# Patient Record
Sex: FEMALE | Race: WHITE | Employment: OTHER | URBAN - METROPOLITAN AREA
[De-identification: names, ages, dates, MRNs, and addresses within clinical notes are randomized per-mention and may not be internally consistent; named-entity substitution may affect disease eponyms.]

---

## 2017-04-03 ENCOUNTER — HOSPITAL ENCOUNTER (OUTPATIENT)
Dept: PHYSICAL THERAPY | Age: 82
Discharge: HOME | End: 2017-04-03
Payer: MEDICARE

## 2017-04-03 PROCEDURE — 97140 MANUAL THERAPY 1/> REGIONS: CPT

## 2017-04-03 PROCEDURE — G8985 CARRY GOAL STATUS: HCPCS

## 2017-04-03 PROCEDURE — 97161 PT EVAL LOW COMPLEX 20 MIN: CPT

## 2017-04-03 PROCEDURE — G8984 CARRY CURRENT STATUS: HCPCS

## 2017-04-03 PROCEDURE — 97035 APP MDLTY 1+ULTRASOUND EA 15: CPT

## 2017-04-03 NOTE — PROGRESS NOTES
[facility] in [address] [place] [address]  Phone: [phone]      Fax:  [phone] of Care/ Statement of Necessity for Physical Therapy Services    Patient name: Trav Samuels Start of Care: 4/3/2017   Referral source: Teodoro Oppenheim : 3/10/1932    Medical Diagnosis: Pain of left shoulder region [M25.512]   Onset Date:  3 mos ago -    MD visit 3/28/17    Treatment Diagnosis:  L shoulder pain    Prior Hospitalization: see medical history Provider#: [id number]   Medications: Verified on Patient summary List    Comorbidities: essential hypertension, OA, hyperlipidemia, intracranial artery occlusion w/infarction      Prior Level of Function:  Patient typically exercises 5 days/wk including lifting weights in this facility. She has recently been unable to use some of the upper body machines, as well as has been having pain in the L shoulder with home reaching forward and upward activities such as reaching into cabinets, carrying items out in front. She recently received cortisone injection in the left shoulder on 3/28/17 and reports notable pain relief. Today's pain in 2/10. The Plan of Care and following information is based on the information from the initial evaluation. (See Objective Test Data attached). Assessment/ key information: Able to reproduce pain with special RTC/impingement tests, including: Yu, Mahin, Full/Empty Can. Patient presents today with lingering pain in the L shoulder region, along with ROM and strength deficits in shoulder flexion, scaption, internal rotation. Signs/symptoms are consistent with rotator cuff pathology and impingement syndrome. US treatment and manual mobiliztion today already improved significantly her AROM and reduced painful arc.  She will benefit from several continued sessions of skilled PT to continue to address deficits and to assist return to pain-free function in UE/carrying activities of daily living, including return to her high level of strengthening exercise routine. Evaluation Complexity History LOW Complexity : Zero comorbidities / personal factors that will impact the outcome / POC; Examination MEDIUM Complexity : 3 Standardized tests and measures addressing body structure, function, activity limitation and / or participation in recreation  ;Presentation LOW Complexity : Stable, uncomplicated  ;Clinical Decision Making MEDIUM Complexity : FOTO score of 26-74  Overall Complexity Rating: MEDIUM  Problem List: pain affecting function, decrease ROM, decrease strength, edema affecting function, decrease ADL/ functional abilitiies, decrease activity tolerance and decrease flexibility/ joint mobility   Treatment Plan may include any combination of the following: Therapeutic exercise, Therapeutic activities, Neuromuscular re-education, Physical agent/modality, Manual therapy, Patient education, Self Care training and Home safety training  Patient / Family readiness to learn indicated by: asking questions, trying to perform skills and interest  Persons(s) to be included in education: patient (P)  Barriers to Learning/Limitations: None  Patient Goal (s): To reduce the pain with my daily activities  Patient Self Reported Health Status:  Good   Rehabilitation Potential: excellent    Short Term Goals: To be accomplished in 2  treatments:    1)  Decrease pain with reaching activities to < 2/10 consistently. 2)  Increase AROM of L shoulder flex and abduction to > 140°, symmetrical to R for reaching up and forward and carrying items pain-free. 3)  Establish HEP to include postural retraining and scapular strengthening exercises. Long Term Goals: To be accomplished in 4 weeks/ less than or equal to 8  treatments:     1) Eliminate  L shoulder and scapular pain.    2)  L UE/shoulder AROM WNL and symmetrical ROM to R, for ability to reach up, forward, behind back and carry groceries/items > 10# without pain. 3) Strength of L shoulder muscles to 4/5 or better for completing all reaching/carrying ADL's and to return to her UE lifting exercises at the gym. 4) Evidence of return to normal ADL function as evidenced by FOTO score improvement of at least 20 pts and ability to reach forward and up in loaded ADL's.   5)  Independent with home/gym exercise program to include return to chest press and UE strength exercises. Frequency / Duration: Patient to be seen 2 times per week for 4  weeks, or up to 8 treatments. Patient/ Caregiver education and instruction: Diagnosis, prognosis, self care, activity modification and exercises      [x]  Plan of care has been reviewed with STAN    G-Gerardo (GP)  Carry  Current:   G 8984  CK - 40 - 59%    Goal:    G 8985  CJ - 20 - 39%      The severity rating is based on clinical judgment and the FOTO score. Certification Period:   4/3/2017 -  5/2/2017     Flako Reed, PT 4/3/2017 5:05 PM  _____________________________________________________________________  I certify that the above Therapy Services are being furnished while the patient is under my care. I agree with the treatment plan and certify that this therapy is necessary. Physician's Signature:____________________  Date:__________Time:______    Please sign and return to   [facility] in [address] NTika Anderson [address] [place]     Phone: [phone]      Fax:  [phone]

## 2017-04-03 NOTE — PROGRESS NOTES
Physical Therapy Evaluation - Shoulder    Posture: [x] Poor    [] Fair    [] Good    Describe: B abducted, superiorly rotated scapulae, forward head. B tight pecs. Scapular dyskinesis w/hypomobility - L > R.      ROM:  [] Unable to assess at this time                                           AROM                                                              PROM   Left Right  Left Right   Flexion 80p! 160 Flexion 110p! WNL   Extension 60  Extension     Scaption/ABD 75p! 165 Scaption/ABD 130p! ER @ 0 Degrees 40  ER @ 0 Degrees No p! ER @ 90 Degrees 65  ER @ 90 Degrees 80    IR @ 90 Degrees 50  IR @ 90 Degrees 70      End Feel / Painful Arc:  @ 110°    Strength:   [] Unable to assess at this time                                                                            L (1-5) R (1-5) Pain   Flexors 2+/5  [x] Yes   [] No   Abductors 2+/5  [x] Yes   [] No   External Rotators 3/5  [x] Yes   [] No   Internal Rotators 3+/5  [] Yes   [] No   Supraspinatus 2+/5  [] Yes   [] No   Serratus Anterior 3+/5  [] Yes   [] No   Lower Trapezius 2+/5  [] Yes   [] No   Elbow Flexion 4/5  [] Yes   [] No   Elbow Extension 4-/5  [] Yes   [] No       Scapulohumoral Control / Rhythm:  Able to eccentrically lower with good control?  Left: [] Yes   [x] No     Right: [x] Yes   [] No    Accessory Motions:    Palpation  [] Min  [] Mod  [x] Severe    Location:  SST joint/insert/belly   [] Min  [x] Mod  [] Severe    Location:   A-C joint   [] Min  [x] Mod  [] Severe    Location:   UT belly       Adson's Test  [] Pos   [] Neg Yergason's Test [] Pos   [] Neg  Cathryn's Test  [] Pos   [] Neg [place]'s Sign [] Pos   [] Neg  Neer's Test  [x] Pos   [] Neg Clunk Test  [] Pos   [] Neg  Hawkin's Test  [x] Pos   [] Neg AC Joint  [x] Pos   [] Neg  Speed's Test  [] Pos   [] Neg SC Joint  [] Pos   [x] Neg  Empty Can  [x] Pos   [] Neg Pectoral Tightness [x] Pos   [] Neg  Anterior Apprehension [] Pos   [] Neg   Posterior Apprehension [x] Pos   [] Neg      Other Tests / Comments:

## 2017-04-03 NOTE — PROGRESS NOTES
PT DAILY TREATMENT NOTE - [facility]     Patient Name: Bridget Mcneill  Date:4/3/2017  : 3/10/1932  [x]  Patient  Verified  Payor: Dolly Mohan / Plan: [place] MED[place] PART [place] & B / Product Type: Medicare /    In time: 3:28   Out time:  4: 32  Total Treatment Time (min):  64  Total Timed Codes (min):  55  1:1 Treatment Time ( only): 54  Visit #:   1  of  8     Treatment Area: Pain of left shoulder region [M25.512]    SUBJECTIVE  Pain Level (0-10 scale):  2/10  Any medication changes, allergies to medications, adverse drug reactions, diagnosis change, or new procedure performed?: [x] No    [x] Yes (see summary sheet for update)  Subjective functional status/changes:   [] No changes reported    Patient reports definite pain relief in the L shoulder since receiving the cortisone injection last week. She had been having pain closer to 7/10 consistently for several weeks now. No specific injury incident - progressively worsening pain in L shoulder/neck region in past 3 mos. OBJECTIVE    Modality rationale: decrease inflammation, decrease pain and increase tissue extensibility to improve the patients ability to move L UE in normal functional ranges.      Min Type Additional Details    [] Estim:  []Unatt       []IFC  []Premod                        []Other:  []w/ice   []w/heat  Position:  Location:    [] Estim: []Att    []TENS instruct  []NMES                    []Other:  []w/US   []w/ice   []w/heat  Position:  Location:    []  Traction: [] Cervical       []Lumbar                       [] Prone          []Supine                       []Intermittent   []Continuous Lbs:  [] before manual  [] after manual   13 [x]  Ultrasound: []Continuous   [x] Pulsed                           []1MHz   [x]3MHz W/cm2: .8 for 7' to L SST/bursa   Location:region of L shoulder     []  Iontophoresis with dexamethasone         Location: [] Take home patch   [] In clinic   10 [x]  Ice     []  heat  []  Ice massage  []  Laser   []  Anodyne Position:  Long-sit with back, neck  & L UE support   Location:  L shoulder     []  Laser with stim  []  Other:  Position:  Location:    []  Vasopneumatic Device Pressure:       [] lo [] med [] hi   Temperature: [] lo [] med [] hi   [x] Skin assessment post-treatment:  [x]intact []redness- no adverse reaction    []redness  adverse reaction:     15 min []Eval                  []Re-Eval       2 min Therapeutic Exercise:  [x] See flow sheet :  Scapular AROM/shrugs/roll   No charge - HEP    15 min Manual Therapy:  B Scap, L GH-gr II/III    Rationale: decrease pain, increase ROM, increase tissue extensibility, decrease trigger points and increase postural awareness to all more normalized L shoulder movement, increase L UE in ADL function. With   [] TE   [x] TA   [] neuro   [] other: Patient Education: [x] Review HEP    [] Progressed/Changed HEP based on:   [x] positioning   [x] body mechanics   [] transfers   [x] heat/ice application    [x] other: postural retraining      Other Objective/Functional Measures:  L sh A/PROM - flex/abd:  70/75 pre-tx. 110 and 130 post-tx. FOTO = 46     Pain Level (0-10 scale) post treatment: 0/10    ASSESSMENT/Changes in Function:  Active L shoulder ROM increased 40 degrees today, in pain-free range following treatment. Notable fascial restrictions released within [facility] and scapulothoracic joints during mobilizations likely contributing to relief and motion improvement. Patient will continue to benefit from skilled PT services to modify and progress therapeutic interventions, address ROM deficits, address strength deficits, analyze and address soft tissue restrictions, analyze and cue movement patterns, analyze and modify body mechanics/ergonomics and assess and modify postural abnormalities to attain remaining goals.      [x]  See Plan of Care  []  See progress note/recertification  []  See Discharge Summary         Progress towards goals / Updated goals:   Improved active range in first visit. Plan to focus on postural retraining, scapular strengthening, elimination of pain, with anticipation of 4-6 visits needed to achieve goal of pain-free functional movement.        See POC for details - GOALS (1st visit today)    PLAN  [x]  Upgrade activities as tolerated     []  Continue plan of care  []  Update interventions per flow sheet       []  Discharge due to:_  []  Other:_      Aisha Chavez, PT 4/3/2017  4:33 PM    Future Appointments  Date Time Provider Dario Smart   4/7/2017 1:30 PM [facility]   4/11/2017 10:30 AM Jaki Gongora, PT [facility]   4/13/2017 10:30 AM Jaki Gongora, PT [facility]   4/18/2017 10:00 AM Jaki Gongora, PT [facility]   4/20/2017 10:00 AM Jaki Gongora, PT [facility]   4/25/2017 10:00 AM Jaki Gongora, PT [facility]   4/27/2017 10:00 AM Jaki Gongora, PT [facility]

## 2017-04-07 ENCOUNTER — HOSPITAL ENCOUNTER (OUTPATIENT)
Dept: PHYSICAL THERAPY | Age: 82
Discharge: HOME | End: 2017-04-07
Payer: MEDICARE

## 2017-04-07 PROCEDURE — 97110 THERAPEUTIC EXERCISES: CPT

## 2017-04-07 PROCEDURE — 97140 MANUAL THERAPY 1/> REGIONS: CPT

## 2017-04-07 NOTE — PROGRESS NOTES
PT DAILY TREATMENT NOTE - [facility]     Patient Name: Mahamed Smith  Date:2017  : 3/10/1932  [x]  Patient  Verified  Payor: Isauro  / Plan: [place] MEDICARE PART A & B / Product Type: Medicare /    In time:1:35   Out time:2:12  Total Treatment Time (min): 37  Total Timed Codes (min): 27  1:1 Treatment Time ( only): 27  Visit #: 2  of  8    Treatment Area: Pain of left shoulder region [M25.512]    SUBJECTIVE  Pain Level (0-10 scale):  310  Any medication changes, allergies to medications, adverse drug reactions, diagnosis change, or new procedure performed?: [x] No    [] Yes (see summary sheet for update)  Subjective functional status/changes:   [] No changes reported     Patient reporting that she is doing better - still some pain, but only with heavier activities     OBJECTIVE    Modality rationale: decrease pain and increase tissue extensibility to improve the patients ability to use L shoulder in work & home ADL's    Min Type Additional Details    [] Estim:  []Unatt       []IFC  []Premod                        []Other:  []w/ice   []w/heat  Position:  Location:    [] Estim: []Att    []TENS instruct  []NMES                    []Other:  []w/US   []w/ice   []w/heat  Position:  Location:    []  Traction: [] Cervical       []Lumbar                       [] Prone          []Supine                       []Intermittent   []Continuous Lbs:  [] before manual  [] after manual    []  Ultrasound: []Continuous   [] Pulsed                           []1MHz   []3MHz W/cm2:  Location:    []  Iontophoresis with dexamethasone         Location: [] Take home patch   [] In clinic   10 []  Ice     [x]  heat  []  Ice massage  []  Laser   []  Anodyne Position:  Supine w/support of head and L UE  Location:  L shoulder     []  Laser with stim  []  Other:  Position:  Location:    []  Vasopneumatic Device Pressure:       [] lo [] med [] hi   Temperature: [] lo [] med [] hi   [] Skin assessment post-treatment:  []intact []redness- no adverse reaction    []redness  adverse reaction:      min []Eval                  []Re-Eval       15 min Therapeutic Exercise:  [x] See flow sheet :   Rationale: increase ROM, increase strength and increase proprioception    15 min Manual Therapy:  scap mobs, GH mobs (post/inf, gr II/III), PROM all planes;  TPR L UT. NMR for scapular isolation without shrugging. Rationale: decrease pain, increase ROM, increase tissue extensibility, decrease trigger points and increase postural awareness            With   [x] TE   [] TA   [] neuro   [x] other: Patient Education: [x] Review HEP    [] Progressed/Changed HEP based on:   [] positioning   [] body mechanics   [] transfers   [] heat/ice application    [x] other:      Other Objective/Functional Measures:  Increased AROM of 20° since last visit      Pain Level (0-10 scale) post treatment:  0/10    ASSESSMENT/Changes in Function:  Notably improved ability to use the L UE pain-free through initial painful arc (to 130 deg) before discomfort ensues. Also less pain overall. Notable release of L scapulothoracic restrictions. Decrease in L TP following manual.  Focus on postural restoration and improving scapular mobility and thoracic strength. Patient will continue to benefit from skilled PT services to modify and progress therapeutic interventions, address ROM deficits, address strength deficits, analyze and address soft tissue restrictions, analyze and cue movement patterns, analyze and modify body mechanics/ergonomics and assess and modify postural abnormalities to attain remaining goals. [x]  See Plan of Care  []  See progress note/recertification  []  See Discharge Summary         Progress towards goals / Updated goals: To be accomplished in 2  treatments:     1) Decrease pain with reaching activities to < 2/10 consistently. Progressing.   2) Increase AROM of L shoulder flex and abduction to > 140°, symmetrical to R for reaching up and forward and carrying items pain-free. Progressing; to 130 deg today. 3) Establish HEP to include postural retraining and scapular strengthening exercises. Progressing     Long Term Goals: To be accomplished in 4 weeks/ less than or equal to 8  treatments:      1) Eliminate L shoulder and scapular pain. 2) L UE/shoulder AROM WNL and symmetrical ROM to R, for ability to reach up, forward, behind back and carry groceries/items > 10# without pain. Not assessed  3) Strength of L shoulder muscles to 4/5 or better for completing all reaching/carrying ADL's and to return to her UE lifting exercises at the gym. Progressing   4) Evidence of return to normal ADL function as evidenced by FOTO score improvement of at least 20 pts and ability to reach forward and up in loaded ADL's. Not assesed  5) Independent with home/gym exercise program to include return to chest press and UE strength exercises. Begin next visit.      PLAN  [x]  Upgrade activities as tolerated     [x]  Continue plan of care  []  Update interventions per flow sheet       []  Discharge due to:_  []  Other:_      Lilly Villeda PT 4/7/2017  3:23 PM    Future Appointments  Date Time Provider Dario Smart   4/11/2017 10:30 AM [facility]   4/13/2017 10:30 AM Magaly Ch PT [facility]   4/18/2017 10:00 AM Magaly Ch PT [facility]   4/20/2017 10:00 AM MARINA ConleyHPMARIELOS THE [facility]   4/25/2017 10:00 AM MARINA ConleyHPMARIELOS THE [facility]   4/27/2017 10:00 AM Kortney Ortiz, PT MIHPTRENATA THE [facility]

## 2017-04-11 ENCOUNTER — HOSPITAL ENCOUNTER (OUTPATIENT)
Dept: PHYSICAL THERAPY | Age: 82
Discharge: HOME | End: 2017-04-11
Payer: MEDICARE

## 2017-04-11 PROCEDURE — 97110 THERAPEUTIC EXERCISES: CPT

## 2017-04-11 NOTE — PROGRESS NOTES
PT DAILY TREATMENT NOTE - [facility]     Patient Name: Antwon Session  Date:2017  : 3/10/1932  [x]  Patient  Verified  Payor: Erica Pak / Plan: [place] MEDICARE PART A & B / Product Type: Medicare /    In time:10:33  Out time:11:20  Total Treatment Time (min): 47  Total Timed Codes (min): 37  1:1 Treatment Time ( [address] only): 0   Visit #: 3 of 8    Treatment Area: Pain of left shoulder region [M25.512]    SUBJECTIVE  Pain Level (0-10 scale): 1/10  Any medication changes, allergies to medications, adverse drug reactions, diagnosis change, or new procedure performed?: [x] No    [] Yes (see summary sheet for update)  Subjective functional status/changes:   [] No changes reported  \"Im doing good.  My shoulder bothered me some last night after I spent the entire day trying to pack to sell my house\"     OBJECTIVE    Modality rationale: decrease inflammation and decrease pain to improve the patients ability to perform overhead ADLs    Min Type Additional Details    [] Estim:  []Unatt       []IFC  []Premod                        []Other:  []w/ice   []w/heat  Position:  Location:    [] Estim: []Att    []TENS instruct  []NMES                    []Other:  []w/US   []w/ice   []w/heat  Position:  Location:    []  Traction: [] Cervical       []Lumbar                       [] Prone          []Supine                       []Intermittent   []Continuous Lbs:  [] before manual  [] after manual    []  Ultrasound: []Continuous   [] Pulsed                           []1MHz   []3MHz W/cm2:  Location:    []  Iontophoresis with dexamethasone         Location: [] Take home patch   [] In clinic   10 []  Ice     [x]  heat  []  Ice massage  []  Laser   []  Anodyne Position: Supine with LEs on wedge  Location: left shoulder     []  Laser with stim  []  Other:  Position:  Location:    []  Vasopneumatic Device Pressure:       [] lo [] med [] hi   Temperature: [] lo [] med [] hi   [] Skin assessment post-treatment:  []intact []redness- no adverse reaction    []redness  adverse reaction:      min []Eval                  []Re-Eval       37 total  0 1:1 min Therapeutic Exercise:  [x] See flow sheet : added wall press, added corner pec sretch, added rows, added shoulder ext    Rationale: increase ROM, increase strength, improve coordination and increase proprioception to improve the patients ability to perform overhead ADLs      min Therapeutic Activity:  []  See flow sheet :         min Neuromuscular Re-education:  []  See flow sheet :        min Manual Therapy:          min Gait Training:  ___ feet with ___ device on level surfaces with ___ level of assist             With   [] TE   [] TA   [] neuro   [] other: Patient Education: [x] Review HEP    [] Progressed/Changed HEP based on:   [] positioning   [] body mechanics   [] transfers   [] heat/ice application    [] other:      Other Objective/Functional Measures:   No objective measures taken today     Pain Level (0-10 scale) post treatment: 0/10    ASSESSMENT/Changes in Function:   Pt motivated and gave good effort during session. She was able to tolerate session with only mild complaints of increased left shoulder discomfort. She required VCs for proper form with exercises. Patient will continue to benefit from skilled PT services to modify and progress therapeutic interventions, address ROM deficits, address strength deficits, analyze and address soft tissue restrictions, analyze and cue movement patterns, analyze and modify body mechanics/ergonomics and assess and modify postural abnormalities to attain remaining goals.      [x] See Plan of Care  [] See progress note/recertification  [] See Discharge Summary      Progress towards goals / Updated goals: To be accomplished in 2 treatments:      1) Decrease pain with reaching activities to < 2/10 consistently. Current status: Progressing.      2) Increase AROM of L shoulder flex and abduction to > 140°, symmetrical to R for reaching up and forward and carrying items pain-free. .  Current status: Progressing; to 130 deg today    3) Establish HEP to include postural retraining and scapular strengthening exercises.   Current status:  Progressing      Long Term Goals: To be accomplished in 4 weeks/ less than or equal to 8 treatments:      1) Eliminate L shoulder and scapular pain. Current status: not assessed    2) L UE/shoulder AROM WNL and symmetrical ROM to R, for ability to reach up, forward, behind back and carry groceries/items > 10# without pain. Current status: not assessed    3) Strength of L shoulder muscles to 4/5 or better for completing all reaching/carrying ADL's and to return to her UE lifting exercises at the gym. Current status: Progressing     4) Evidence of return to normal ADL function as evidenced by FOTO score improvement of at least 20 pts and ability to reach forward and up in loaded ADL's. Current status: not assessed    5) Independent with home/gym exercise program to include return to chest press and UE strength exercises. Begin next visit.    Current status: not assessed    PLAN  [x]  Upgrade activities as tolerated     [x]  Continue plan of care  []  Update interventions per flow sheet       []  Discharge due to:_  []  Other:_      Mal Domínguez 4/11/2017  11:14 AM    Future Appointments  Date Time Provider Dario Smart   4/13/2017 10:30 AM [facility] HANNAH THE [facility]   4/18/2017 10:00 AM Graydon Lesches, PT MI[facility]PTD THE [facility]   4/20/2017 10:00 AM Graydon Lesches, PT MI[facility]PTD THE [facility]   4/25/2017 10:00 AM Graydon Lesches, PT [facility]PTD THE [facility]   4/27/2017 10:00 AM MARINA De La Torre THE [facility]

## 2017-04-13 ENCOUNTER — HOSPITAL ENCOUNTER (OUTPATIENT)
Dept: PHYSICAL THERAPY | Age: 82
Discharge: HOME | End: 2017-04-13
Payer: MEDICARE

## 2017-04-13 NOTE — PROGRESS NOTES
PT DAILY TREATMENT NOTE - [facility]     Patient Name: Harpreet Keyes  Date:2017  : 3/10/1932  [x]  Patient  Verified  Payor: Indra Calix / Plan: [place] / Product Type: Medicare /    In time:10:35  Out time:11:20  Total Treatment Time (min): 45  Total Timed Codes (min): 35  1:1 Treatment Time ( [address] only): 0   Visit #: 4 of 8    Treatment Area: Pain of left shoulder region [M25.512]    SUBJECTIVE  Pain Level (0-10 scale): 1/10  Any medication changes, allergies to medications, adverse drug reactions, diagnosis change, or new procedure performed?: [x] No    [] Yes (see summary sheet for update)  Subjective functional status/changes:   [] No changes reported  \"Im doing okay.  There a little pain when I go to move the arm, but mostly just stretching\"     OBJECTIVE    Modality rationale: decrease inflammation and decrease pain to improve the patients ability to normalize overhead ADLs    Min Type Additional Details    [] Estim:  []Unatt       []IFC  []Premod                        []Other:  []w/ice   []w/heat  Position:  Location:    [] Estim: []Att    []TENS instruct  []NMES                    []Other:  []w/US   []w/ice   []w/heat  Position:  Location:    []  Traction: [] Cervical       []Lumbar                       [] Prone          []Supine                       []Intermittent   []Continuous Lbs:  [] before manual  [] after manual    []  Ultrasound: []Continuous   [] Pulsed                           []1MHz   []3MHz W/cm2:  Location:    []  Iontophoresis with dexamethasone         Location: [] Take home patch   [] In clinic   10 []  Ice     [x]  heat  []  Ice massage  []  Laser   []  Anodyne Position: supine   Location: left shoulder     []  Laser with stim  []  Other:  Position:  Location:    []  Vasopneumatic Device Pressure:       [] lo [] med [] hi   Temperature: [] lo [] med [] hi   [] Skin assessment post-treatment:  []intact []redness- no adverse reaction    []redness  adverse reaction: min []Eval                  []Re-Eval       35 total  0 1:1 min Therapeutic Exercise:  [x] See flow sheet : added supine D2 diagonals, added serratus punches   Rationale: increase ROM, increase strength and improve coordination to improve the patients ability to normalize overhead ADLs      min Therapeutic Activity:  []  See flow sheet :         min Neuromuscular Re-education:  []  See flow sheet :        min Manual Therapy:          min Gait Training:  ___ feet with ___ device on level surfaces with ___ level of assist             With   [] TE   [] TA   [] neuro   [] other: Patient Education: [x] Review HEP    [] Progressed/Changed HEP based on:   [] positioning   [] body mechanics   [] transfers   [] heat/ice application    [] other:      Other Objective/Functional Measures:   Left shoulder AROM  Flexion: 128 degrees  Abduction: 135 degrees     Pain Level (0-10 scale) post treatment: 0/10    ASSESSMENT/Changes in Function:   Pt able to tolerate progression of shoulder and postural strengthening without increased discomfort. Pt with difficulty isolating proper movement with serratus punches. Patient will continue to benefit from skilled PT services to modify and progress therapeutic interventions, address ROM deficits, address strength deficits, analyze and address soft tissue restrictions, analyze and cue movement patterns, analyze and modify body mechanics/ergonomics and assess and modify postural abnormalities to attain remaining goals.      [x] See Plan of Care  [] See progress note/recertification  [] See Discharge Summary      Progress towards goals / Updated goals: To be accomplished in 2 treatments:      1) Decrease pain with reaching activities to < 2/10 consistently. Current status: Progressing (Pain 3/10 at worst with reaching) 4/13/17       2) Increase AROM of L shoulder flex and abduction to > 140°, symmetrical to R for reaching up and forward and carrying items pain-free. .  Current status: Progressing (Left shoulder abduction 135 degrees, flexion 128 degrees) 4/13/17      3) Establish HEP to include postural retraining and scapular strengthening exercises.   Current status:  Progressing       Long Term Goals: To be accomplished in 4 weeks/ less than or equal to 8 treatments:      1) Eliminate L shoulder and scapular pain. Current status: not assessed     2) L UE/shoulder AROM WNL and symmetrical ROM to R, for ability to reach up, forward, behind back and carry groceries/items > 10# without pain. Current status: not assessed     3) Strength of L shoulder muscles to 4/5 or better for completing all reaching/carrying ADL's and to return to her UE lifting exercises at the gym. Current status: Progressing      4) Evidence of return to normal ADL function as evidenced by FOTO score improvement of at least 20 pts and ability to reach forward and up in loaded ADL's. Current status: not assessed     5) Independent with home/gym exercise program to include return to chest press and UE strength exercises. Begin next visit.    Current status: not assessed    PLAN  [x]  Upgrade activities as tolerated     [x]  Continue plan of care  []  Update interventions per flow sheet       []  Discharge due to:_  []  Other:_      Emry Side 4/13/2017  12:02 PM    Future Appointments  Date Time Provider Dario Smart   4/18/2017 10:00 AM MARINA Saldana THE [facility]   4/20/2017 10:00 AM MARINA Saldana THE [facility]   4/25/2017 10:00 AM MARINA Saldana THE [facility]   4/27/2017 10:00 AM MARINA Díaz THE [facility]

## 2017-04-18 ENCOUNTER — HOSPITAL ENCOUNTER (OUTPATIENT)
Dept: PHYSICAL THERAPY | Age: 82
Discharge: HOME | End: 2017-04-18
Payer: MEDICARE

## 2017-04-18 PROCEDURE — 97110 THERAPEUTIC EXERCISES: CPT

## 2017-04-18 NOTE — PROGRESS NOTES
PT DAILY TREATMENT NOTE - [facility]     Patient Name: Salma Perez  Date:2017  : 3/10/1932  [x]  Patient  Verified  Payor: Fabi Terrazas / Plan: [place] MED[place]ARE PART A [place] B / Product Type: Medicare /    In time:10:05  Out time:11:00  Total Treatment Time (min): 55  Total Timed Codes (min): 55  1:1 Treatment Time ( [address] only): 54   Visit #: 5 of 8    Treatment Area: Pain of left shoulder region [M25.512]    SUBJECTIVE  Pain Level (0-10 scale): 0/10  Any medication changes, allergies to medications, adverse drug reactions, diagnosis change, or new procedure performed?: [x] No    [] Yes (see summary sheet for update)  Subjective functional status/changes:   [] No changes reported  \"I don't always have pain, but it hurts inconsistently when I do certain things. \"    OBJECTIVE    Modality rationale:    Min Type Additional Details    [] Estim:  []Unatt       []IFC  []Premod                        []Other:  []w/ice   []w/heat  Position:  Location:    [] Estim: []Att    []TENS instruct  []NMES                    []Other:  []w/US   []w/ice   []w/heat  Position:  Location:    []  Traction: [] Cervical       []Lumbar                       [] Prone          []Supine                       []Intermittent   []Continuous Lbs:  [] before manual  [] after manual    []  Ultrasound: []Continuous   [] Pulsed                           []1MHz   []3MHz W/cm2:  Location:    []  Iontophoresis with dexamethasone         Location: [] Take home patch   [] In clinic    []  Ice     []  heat  []  Ice massage  []  Laser   []  Anodyne Position:  Location:    []  Laser with stim  []  Other:  Position:  Location:    []  Vasopneumatic Device Pressure:       [] lo [] med [] hi   Temperature: [] lo [] med [] hi   [] Skin assessment post-treatment:  []intact []redness- no adverse reaction    []redness  adverse reaction:      min []Eval                  []Re-Eval       55 1:1 min Therapeutic Exercise:  [x] See flow sheet :  Added wall pushups at [employer] level, increased load of shrugs to 5 lbs. Load, added serratus anterior presses making circles with ball on wall   Rationale: increase ROM, increase strength, improve coordination and increase proprioception to improve the patients ability to normalize ADLs. min Therapeutic Activity:  []  See flow sheet :         min Neuromuscular Re-education:  []  See flow sheet :        min Manual Therapy:          min Gait Training:  ___ feet with ___ device on level surfaces with ___ level of assist   Rationale: With   [] TE   [] TA   [] neuro   [] other: Patient Education: [x] Review HEP    [] Progressed/Changed HEP based on:   [] positioning   [] body mechanics   [] transfers   [] heat/ice application    [] other:      Other Objective/Functional Measures:   No objective measures taken today. Pain Level (0-10 scale) post treatment: 0/10    ASSESSMENT/Changes in Function:    Pt reports intermittent and inconsistent pain in the left shoulder during ADLs and exercises. Strength and function are improving. Patient will continue to benefit from skilled PT services to modify and progress therapeutic interventions, address ROM deficits, address strength deficits, analyze and address soft tissue restrictions, analyze and cue movement patterns, analyze and modify body mechanics/ergonomics and assess and modify postural abnormalities to attain remaining goals. []  See Plan of Care  []  See progress note/recertification  []  See Discharge Summary         Progress towards goals / Updated goals: To be accomplished in 2 treatments:  1) Decrease pain with reaching activities to < 2/10 consistently. Status at initial evaluation:  Current status: Progressing (Pain 3/10 at worst with reaching) 4/13/17       2) Increase AROM of left shoulder flex and abduction to > 140°, symmetrical to right for reaching up and forward and carrying items pain-free. .  Status at initial evaluation:  Current status: Progressing (Left shoulder abduction 135 degrees, flexion 128 degrees) 4/13/17       3) Establish HEP to include postural retraining and scapular strengthening exercises. Status at initial evaluation:  Current status: met      Long Term Goals: To be accomplished in 4 weeks/ less than or equal to 8 treatments:  1) Eliminate left shoulder and scapular pain. Current status: not assessed      2) Left UE/shoulder AROM WNL and symmetrical ROM to right, for ability to reach up, forward, behind back and carry groceries/items > 10# without pain. Status at initial evaluation:  Current status: not assessed      3) Strength of left shoulder muscles to 4/5 or better for completing all reaching/carrying ADL's and to return to her UE lifting exercises at the gym. Status at initial evaluation:  Current status: Progressing       4) Evidence of return to normal ADL function as evidenced by FOTO score improvement of at least 20 pts and ability to reach forward and up in loaded ADL's. Status at initial evaluation:  Current status: not assessed      5) Independent with home/gym exercise program to include return to chest press and UE strength exercises. Begin next visit.    Status at initial evaluation:  Current status: not assessed    PLAN  []  Upgrade activities as tolerated     [x]  Continue plan of care  []  Update interventions per flow sheet       []  Discharge due to:_  []  Other:_      Guido Melissa PT 4/18/2017  9:59 AM    Future Appointments  Date Time Provider Dario Smart   4/18/2017 10:00 AM MARINA MaharajHPMARIELOS THE [facility]   4/20/2017 10:00 AM AMRINA MaharajHPMARIELOS [facility]   4/25/2017 10:00 AM MARINA MaharajHPMARIELOS THE [facility]   4/27/2017 10:00 AM Kortney Lyons PT [facility]HPTRENATA THE [facility]

## 2017-04-20 ENCOUNTER — HOSPITAL ENCOUNTER (OUTPATIENT)
Dept: PHYSICAL THERAPY | Age: 82
Discharge: HOME | End: 2017-04-20
Payer: MEDICARE

## 2017-04-20 PROCEDURE — 97110 THERAPEUTIC EXERCISES: CPT

## 2017-04-20 NOTE — PROGRESS NOTES
PT DAILY TREATMENT NOTE - [facility]     Patient Name: Bridget Mcneill  Date:2017  : 3/10/1932  [x]  Patient  Verified  Payor: [place] MEDICARE / Plan: [place] MEDICARE PART A & B / Product Type: Medicare /    In time:10:00  Out time:10:52  Total Treatment Time (min): 52  Total Timed Codes (min): 42  1:1 Treatment Time ([facility] only): 15   Visit #: 6 of 8    Treatment Area: Pain of left shoulder region [M25.512]    SUBJECTIVE  Pain Level (0-10 scale): 0/10  Any medication changes, allergies to medications, adverse drug reactions, diagnosis change, or new procedure performed?: [x] No    [] Yes (see summary sheet for update)  Subjective functional status/changes:   [] No changes reported  \"Im doing fine. My shoulder doesn't really hurt, its just tight when I move it in certain directions. \"    OBJECTIVE    Modality rationale: decrease inflammation and decrease pain to improve the patients ability to normalize overhead ADLs    Min Type Additional Details    [] Estim:  []Unatt       []IFC  []Premod                        []Other:  []w/ice   []w/heat  Position:  Location:    [] Estim: []Att    []TENS instruct  []NMES                    []Other:  []w/US   []w/ice   []w/heat  Position:  Location:    []  Traction: [] Cervical       []Lumbar                       [] Prone          []Supine                       []Intermittent   []Continuous Lbs:  [] before manual  [] after manual    []  Ultrasound: []Continuous   [] Pulsed                           []1MHz   []3MHz W/cm2:  Location:    []  Iontophoresis with dexamethasone         Location: [] Take home patch   [] In clinic   10 []  Ice     [x]  heat  []  Ice massage  []  Laser   []  Anodyne Position: supine   Location: left shoulder and neck    []  Laser with stim  []  Other:  Position:  Location:    []  Vasopneumatic Device Pressure:       [] lo [] med [] hi   Temperature: [] lo [] med [] hi   [] Skin assessment post-treatment:  []intact []redness- no adverse reaction    []redness  adverse reaction:      min []Eval                  []Re-Eval       42 total 15 1:1 min Therapeutic Exercise:  [x] See flow sheet : added shoulder depressions    Rationale: increase ROM and increase strength to improve the patients ability to normalize overhead ADLs      min Therapeutic Activity:  []  See flow sheet :         min Neuromuscular Re-education:  []  See flow sheet :        min Manual Therapy:          min Gait Training:  ___ feet with ___ device on level surfaces with ___ level of assist             With   [] TE   [] TA   [] neuro   [] other: Patient Education: [x] Review HEP    [] Progressed/Changed HEP based on:   [] positioning   [] body mechanics   [] transfers   [] heat/ice application    [] other:      Other Objective/Functional Measures:   Left shoulder strength grossly 4-/5 throughout     Pain Level (0-10 scale) post treatment: 0/10    ASSESSMENT/Changes in Function:   Pt able to complete session with good tolerance. She is nearly independent with her program; however, requires occassional VCs for proper form with some clinic eercises at this time. Patient will continue to benefit from skilled PT services to modify and progress therapeutic interventions, address ROM deficits, address strength deficits, analyze and address soft tissue restrictions, analyze and cue movement patterns, analyze and modify body mechanics/ergonomics and assess and modify postural abnormalities to attain remaining goals.      [x] See Plan of Care  [] See progress note/recertification  [] See Discharge Summary      Progress towards goals / Updated goals: To be accomplished in 2 treatments:  1) Decrease pain with reaching activities to < 2/10 consistently.    Status at initial evaluation:  Current status: MET (Pt reports pain 4/37 at worst with certain overhead activities) 4/20/17       2) Increase AROM of left shoulder flex and abduction to > 140°, symmetrical to right for reaching up and forward and carrying items pain-free. .  Status at initial evaluation:  Current status: Progressing (Left shoulder abduction 135 degrees, flexion 128 degrees) 4/13/17       3) Establish HEP to include postural retraining and scapular strengthening exercises. Status at initial evaluation: HEP not established   Current status: MET (HEP established ) 4/13/17      Long Term Goals: To be accomplished in 4 weeks/ less than or equal to 8 treatments:  1) Eliminate left shoulder and scapular pain. Current status: Progressing (Pt reports pain 7/42 at worst with certain overhead activities) 4/20/17      2) Left UE/shoulder AROM WNL and symmetrical ROM to right, for ability to reach up, forward, behind back and carry groceries/items > 10# without pain. Status at initial evaluation:     AROM       Left Right   Flexion 80p! 160   Extension 60     Scaption/ABD 75p! 165   ER @ 0 Degrees 40     ER @ 90 Degrees 65     IR @ 90 Degrees 50     Current status: not assessed      3) Strength of left shoulder muscles to 4/5 or better for completing all reaching/carrying ADL's and to return to her UE lifting exercises at the gym. Status at initial evaluation:  Left shoulder flexion and abduction 2+/5, left shoulder IR 3+/5, left shoulder ER 3/5   Current status:Progressing (Left shoulder strength grossly 4-/5 throughout) 4/20/17       4) Evidence of return to normal ADL function as evidenced by FOTO score improvement of at least 20 pts and ability to reach forward and up in loaded ADL's. Status at initial evaluation: FOTO 51/100  Current status: Progressing (FOTO score improved 14 points to 65/100) 4/18/17      5) Independent with home/gym exercise program to include return to chest press and UE strength exercises.     Status at initial evaluation: Program to be initiated   Current status: Progressed (Pt is independent with prescribed HEP; however, she requires occassional VCs for proper form with clinic exercises and has not progressed to chest press at this time) 4/20/17    PLAN  [x]  Upgrade activities as tolerated     [x]  Continue plan of care  []  Update interventions per flow sheet       []  Discharge due to:_  []  Other:_      Dorthula Duane 4/20/2017  1:42 PM    Future Appointments  Date Time Provider Dario Smart   4/25/2017 10:00 AM MARINA Hinton [facility]   4/27/2017 10:00 AM MARINA Thomas THE [facility]

## 2017-04-25 ENCOUNTER — HOSPITAL ENCOUNTER (OUTPATIENT)
Dept: PHYSICAL THERAPY | Age: 82
Discharge: HOME | End: 2017-04-25
Payer: MEDICARE

## 2017-04-25 PROCEDURE — 97110 THERAPEUTIC EXERCISES: CPT

## 2017-04-25 NOTE — PROGRESS NOTES
PT DAILY TREATMENT NOTE - [facility]     Patient Name: Mario Alberto Peterson  Date:2017  : 3/10/1932  [x]  Patient  Verified  Payor: Baldemar Ray / Plan: [place] & [place] / Product Type: Medicare /    In time:10:01  Out time:10:45  Total Treatment Time (min): 44  Total Timed Codes (min): 44  1:1 Treatment Time ( [address] only): 40   Visit #: 7 of 8    Treatment Area: Pain of left shoulder region [M25.512]    SUBJECTIVE  Pain Level (0-10 scale): 0/10  Any medication changes, allergies to medications, adverse drug reactions, diagnosis change, or new procedure performed?: [x] No    [] Yes (see summary sheet for update)  Subjective functional status/changes:   [] No changes reported  \"I'm doing 100% of all my normal activities. Pain 0/10 at worst during all activities. \"    OBJECTIVE    Modality rationale:    Min Type Additional Details    [] Estim:  []Unatt       []IFC  []Premod                        []Other:  []w/ice   []w/heat  Position:  Location:    [] Estim: []Att    []TENS instruct  []NMES                    []Other:  []w/US   []w/ice   []w/heat  Position:  Location:    []  Traction: [] Cervical       []Lumbar                       [] Prone          []Supine                       []Intermittent   []Continuous Lbs:  [] before manual  [] after manual    []  Ultrasound: []Continuous   [] Pulsed                           []1MHz   []3MHz W/cm2:  Location:    []  Iontophoresis with dexamethasone         Location: [] Take home patch   [] In clinic    []  Ice     []  heat  []  Ice massage  []  Laser   []  Anodyne Position:  Location:    []  Laser with stim  []  Other:  Position:  Location:    []  Vasopneumatic Device Pressure:       [] lo [] med [] hi   Temperature: [] lo [] med [] hi   [] Skin assessment post-treatment:  []intact []redness- no adverse reaction    []redness  adverse reaction:      min []Eval                  []Re-Eval       44 min Therapeutic Exercise:  [x] See flow sheet :  Assessed strength and AROM Rationale: increase ROM, increase strength, improve coordination and increase proprioception to improve the patients ability to normalize reaching and ADLs. min Therapeutic Activity:  []  See flow sheet :         min Neuromuscular Re-education:  []  See flow sheet :        min Manual Therapy:          min Gait Training:  ___ feet with ___ device on level surfaces with ___ level of assist   Rationale: With   [] TE   [] TA   [] neuro   [] other: Patient Education: [x] Review HEP    [] Progressed/Changed HEP based on:   [] positioning   [] body mechanics   [] transfers   [] heat/ice application    [] other:      Other Objective/Functional Measures:   AROM shoulder flexion: 166 degrees (improved 86 degrees)  Abduction: 156 degrees (improved 82 degrees)  Scaption: 150 degrees (improved 75 degrees)  ER: T2 level bilaterally   IR: T6 level left, T8 level right  Strength shoulder flexion: 4/5 (improved 1 2/3 grade)  Abduction: 4/5 (improved 1 2/3 grade)  Shoulder extension: 5/5  ER: 4+/5 (improved 1 1/3 grade)  IR: 4+/5 (improved 1 grade)  Elbow flexion: 5/5 (improved 1 grade)  Elbow extension: 5/5 (improved 1 1/3 grade)    Pain Level (0-10 scale) post treatment: 0/10    ASSESSMENT/Changes in Function:    Pt's left UE strength and AROM has improved significantly compared to [facility] and AROM is equal to right UE. Patient will continue to benefit from skilled PT services to modify and progress therapeutic interventions, address ROM deficits, address strength deficits, analyze and address soft tissue restrictions, analyze and cue movement patterns, analyze and modify body mechanics/ergonomics and assess and modify postural abnormalities to attain remaining goals. []  See Plan of Care  []  See progress note/recertification  []  See Discharge Summary         Progress towards goals / Updated goals: To be accomplished in 2 treatments:  1) Decrease pain with reaching activities to < 2/10 consistently.    Status at initial evaluation:  Current status: MET (Pt reports pain 7/57 at worst with certain overhead activities) 4/20/17       2) Increase AROM of left shoulder flex and abduction to > 140°, symmetrical to right for reaching up and forward and carrying items pain-free. .  Status at initial evaluation:  Current status: met (AROM shoulder flexion: 166 degrees (improved 86 degrees), Abduction: 156 degrees (improved 82 degrees)) 4/25/17      3) Establish HEP to include postural retraining and scapular strengthening exercises. Status at initial evaluation: HEP not established   Current status: MET (HEP established ) 4/13/17      Long Term Goals: To be accomplished in 4 weeks/ less than or equal to 8 treatments:  1) Eliminate left shoulder and scapular pain. Current status: Progressing (Pt reports pain 8/86 at worst with certain overhead activities) 4/20/17      2) Left UE/shoulder AROM WNL and symmetrical ROM to right, for ability to reach up, forward, behind back and carry groceries/items > 10# without pain. Status at initial evaluation:  AROM        Left Right   Flexion 80p! 160   Extension 60      Scaption/ABD 75p! 165   ER @ 0 Degrees 40      ER @ 90 Degrees 65      IR @ 90 Degrees 50      Current status: met (AROM shoulder flexion: 166 degrees (improved 86 degrees), Abduction: 156 degrees (improved 82 degrees), Scaption: 150 degrees (improved 75 degrees), ER: T2 level bilaterally, IR: T6 level left, T8 level right) 4/25/17      3) Strength of left shoulder muscles to 4/5 or better for completing all reaching/carrying ADL's and to return to her UE lifting exercises at the gym.    Status at initial evaluation: Left shoulder flexion and abduction 2+/5, left shoulder IR 3+/5, left shoulder ER 3/5   Current status:met (Strength shoulder flexion: 4/5 (improved 1 2/3 grade), Abduction: 4/5 (improved 1 2/3 grade), Shoulder extension: 5/5, ER: 4+/5 (improved 1 1/3 grade), IR: 4+/5 (improved 1 grade), Elbow flexion: 5/5 (improved 1 grade), Elbow extension: 5/5 (improved 1 1/3 grade)) 4/25/17       4) Evidence of return to normal ADL function as evidenced by FOTO score improvement of at least 20 pts and ability to reach forward and up in loaded ADL's. Status at initial evaluation: FOTO 51/100  Current status: Progressing (FOTO score improved 14 points to 65/100) 4/18/17      5) Independent with home/gym exercise program to include return to chest press and UE strength exercises.    Status at initial evaluation: Program to be initiated   Current status: Progressed (Pt is independent with prescribed HEP; however, she requires occassional VCs for proper form with clinic exercises and has not progressed to chest press at this time) 4/20/17    PLAN  []  Upgrade activities as tolerated     [x]  Continue plan of care  []  Update interventions per flow sheet       []  Discharge due to:_  []  Other: plan to D/C after next treatment to self monitored HEP      Rosanne Byrne, PT 4/25/2017  10:52 AM    Future Appointments  Date Time Provider Dario Smart   4/27/2017 10:00 AM [facility]

## 2017-04-27 ENCOUNTER — HOSPITAL ENCOUNTER (OUTPATIENT)
Dept: PHYSICAL THERAPY | Age: 82
Discharge: HOME | End: 2017-04-27
Payer: MEDICARE

## 2017-04-27 PROCEDURE — 97530 THERAPEUTIC ACTIVITIES: CPT

## 2017-04-27 PROCEDURE — G8985 CARRY GOAL STATUS: HCPCS

## 2017-04-27 PROCEDURE — G8986 CARRY D/C STATUS: HCPCS

## 2017-04-27 PROCEDURE — 97110 THERAPEUTIC EXERCISES: CPT

## 2017-04-27 NOTE — PROGRESS NOTES
[facility] in [facility]. [place], [address] [place] [address]  Phone: [phone]      Fax:  [phone]    Discharge Summary      Patient name: Erick Shin Start of Care: 4/3/2017   Referral source: Poncho Kylah : 3/10/1932    Medical Diagnosis: Pain of left shoulder region [M25.512] Onset Date: 3 mos ago - MD visit 3/28/17    Treatment Diagnosis: L shoulder pain    Prior Hospitalization: see medical history Provider#: [id number]   Medications: Verified on Patient summary List   Comorbidities: essential hypertension, OA, hyperlipidemia, intracranial artery occlusion w/infarction       Visits from Start of Care: 8    Missed Visits: 0  Reporting Period : 4/3/17 to 17      Progress towards goals / Updated goals:Mrs. Nathanael Arnett has shown excellent improvement in left shoulder mobility, strength and overall function. She is pain free and has returned to all ADL. 8 of 9 goals have been met and her FOTO score has improved from 51/100 to 69 (Goal almost met). She is independent with an advanced HEP and will be discharged from therapy at this time. To be accomplished in 2 treatments:  1) Decrease pain with reaching activities to < 2/10 consistently. Status at initial evaluation:  Current status: MET (Pt reports pain 4/43 at worst with certain overhead activities) 17       2) Increase AROM of left shoulder flex and abduction to > 140°, symmetrical to right for reaching up and forward and carrying items pain-free. .  Status at initial evaluation:  Current status: met (AROM shoulder flexion: 166 degrees (improved 86 degrees), Abduction: 156 degrees (improved 82 degrees)) 17      3) Establish HEP to include postural retraining and scapular strengthening exercises. Status at initial evaluation: HEP not established   Current status: MET (HEP established ) 17      Long Term Goals:  To be accomplished in 4 weeks/ less than or equal to 8 treatments:  1) Eliminate left shoulder and scapular pain. Current status: Progressing (Pt reports pain 7/25 at worst with certain overhead activities) 4/20/17  Status at D/C: no pain with ADL, goal met      2) Left UE/shoulder AROM WNL and symmetrical ROM to right, for ability to reach up, forward, behind back and carry groceries/items > 10# without pain. Status at initial evaluation:  AROM        Left Right   Flexion 80p! 160   Extension 60      Scaption/ABD 75p! 165   ER @ 0 Degrees 40      ER @ 90 Degrees 65      IR @ 90 Degrees 50      Current status: met (AROM shoulder flexion: 166 degrees (improved 86 degrees), Abduction: 156 degrees (improved 82 degrees), Scaption: 150 degrees (improved 75 degrees), ER: T2 level bilaterally, IR: T6 level left, T8 level right) 4/25/17      3) Strength of left shoulder muscles to 4/5 or better for completing all reaching/carrying ADL's and to return to her UE lifting exercises at the gym. Status at initial evaluation: Left shoulder flexion and abduction 2+/5, left shoulder IR 3+/5, left shoulder ER 3/5   Current status:met (Strength shoulder flexion: 4/5 (improved 1 2/3 grade), Abduction: 4/5 (improved 1 2/3 grade), Shoulder extension: 5/5, ER: 4+/5 (improved 1 1/3 grade), IR: 4+/5 (improved 1 grade), Elbow flexion: 5/5 (improved 1 grade), Elbow extension: 5/5 (improved 1 1/3 grade)) 4/25/17 , goal met      4) Evidence of return to normal ADL function as evidenced by FOTO score improvement of at least 20 pts and ability to reach forward and up in loaded ADL's. Status at initial evaluation: FOTO 51/100  Current status: Progressing (FOTO score improved 14 points to 65/100) 4/18/17  Status at D/C: FOTO 69/100, goal almost met        5) Independent with home/gym exercise program to include return to chest press a    nd UE strength exercises.    Status at initial evaluation: Program to be initiated   Current status: Progressed (Pt is independent with prescribed HEP; however, she requires occassional VCs for proper form with clinic exercises and has not progressed to chest press at this time) 4/20/17  Status at D/C: Independent with HEP, will resume post rehab program, goal met  G-Codes (GP)      Carry    Goal  CJ= 20-39%   D/C  CJ= 20-39%        The severity rating is based on clinical judgment and the FOTO score.     Assessment/ Summary of Care: excellent progress and function    RECOMMENDATIONS:  [x]Discontinue therapy: [x]Patient has reached or is progressing toward set goals      []Patient is non-compliant or has abdicated      []Due to lack of appreciable progress towards set goals    Lady Gaspar, PT 4/27/2017 11:26 AM

## 2017-04-27 NOTE — PROGRESS NOTES
PT DAILY TREATMENT NOTE - [facility]     Patient Name: Karen Conteh  Date:2017  : 3/10/1932  [x]  Patient  Verified  Payor: Eve Chamber / Plan: [place] MEDICARE PART A & B / Product Type: Medicare /    In time:1009  Out time:10:50  Total Treatment Time (min): 41  Total Timed Codes (min): 41  1:1 Treatment Time ([facility] only): 41   Visit #: 8 of 8    Treatment Area: Pain of left shoulder region [M25.512]    SUBJECTIVE  Pain Level (0-10 scale): 0/10  Any medication changes, allergies to medications, adverse drug reactions, diagnosis change, or new procedure performed?: [x] No    [] Yes (see summary sheet for update)  Subjective functional status/changes:   [] No changes reported  \"I'm doing fine, have returned to all function. No more pain. \"    OBJECTIVE    Modality rationale:    Min Type Additional Details    [] Estim:  []Unatt       []IFC  []Premod                        []Other:  []w/ice   []w/heat  Position:  Location:    [] Estim: []Att    []TENS instruct  []NMES                    []Other:  []w/US   []w/ice   []w/heat  Position:  Location:    []  Traction: [] Cervical       []Lumbar                       [] Prone          []Supine                       []Intermittent   []Continuous Lbs:  [] before manual  [] after manual    []  Ultrasound: []Continuous   [] Pulsed                           []1MHz   []3MHz W/cm2:  Location:    []  Iontophoresis with dexamethasone         Location: [] Take home patch   [] In clinic    []  Ice     []  heat  []  Ice massage  []  Laser   []  Anodyne Position:  Location:    []  Laser with stim  []  Other:  Position:  Location:    []  Vasopneumatic Device Pressure:       [] lo [] med [] hi   Temperature: [] lo [] med [] hi   [] Skin assessment post-treatment:  []intact []redness- no adverse reaction    []redness  adverse reaction:      min []Eval                  []Re-Eval       26 min Therapeutic Exercise:  [x] See flow sheet :  Assessed strength and AROM   Rationale: increase ROM, increase strength, improve coordination and increase proprioception to improve the patients ability to normalize reaching and ADLs. 15 min Therapeutic Activity:  [x]  See flow sheet :reevaluation, review of HEP         min Neuromuscular Re-education:  []  See flow sheet :        min Manual Therapy:          min Gait Training:  ___ feet with ___ device on level surfaces with ___ level of assist   Rationale: With   [] TE   [] TA   [] neuro   [] other: Patient Education: [x] Review HEP    [] Progressed/Changed HEP based on:   [] positioning   [] body mechanics   [] transfers   [] heat/ice application    [] other:      Other Objective/Functional Measures:   AROM shoulder flexion: 166 degrees (improved 86 degrees)  Abduction: 156 degrees (improved 82 degrees)  Scaption: 150 degrees (improved 75 degrees)  ER: T2 level bilaterally   IR: T6 level left, T8 level right  Strength shoulder flexion: 4/5 (improved 1 2/3 grade)  Abduction: 4/5 (improved 1 2/3 grade)  Shoulder extension: 5/5  ER: 4+/5 (improved 1 1/3 grade)  IR: 4+/5 (improved 1 grade)  Elbow flexion: 5/5 (improved 1 grade)  Elbow extension: 5/5 (improved 1 1/3 grade)  Residual tightness with overhead reaching  FOTO 69, improved    Pain Level (0-10 scale) post treatment: 0/10      [x]  See Discharge Summary         Progress towards goals / Updated Waneta Schwab has shown excellent improvement in left shoulder mobility, strength and overall function. She is pain free and has returned to all ADL. 8 of 9 goals have been met and her FOTO score has improved from 51/100 to 69 (Goal almost met). She is independent with an advanced HEP and will be discharged from therapy at this time. To be accomplished in 2 treatments:  1) Decrease pain with reaching activities to < 2/10 consistently.    Status at initial evaluation:  Current status: MET (Pt reports pain 3/49 at worst with certain overhead activities) 4/20/17       2) Increase AROM of left shoulder flex and abduction to > 140°, symmetrical to right for reaching up and forward and carrying items pain-free. .  Status at initial evaluation:  Current status: met (AROM shoulder flexion: 166 degrees (improved 86 degrees), Abduction: 156 degrees (improved 82 degrees)) 4/25/17      3) Establish HEP to include postural retraining and scapular strengthening exercises. Status at initial evaluation: HEP not established   Current status: MET (HEP established ) 4/13/17      Long Term Goals: To be accomplished in 4 weeks/ less than or equal to 8 treatments:  1) Eliminate left shoulder and scapular pain. Current status: Progressing (Pt reports pain 9/22 at worst with certain overhead activities) 4/20/17  Status at D/C: no pain with ADL, goal met      2) Left UE/shoulder AROM WNL and symmetrical ROM to right, for ability to reach up, forward, behind back and carry groceries/items > 10# without pain. Status at initial evaluation:  AROM        Left Right   Flexion 80p! 160   Extension 60      Scaption/ABD 75p! 165   ER @ 0 Degrees 40      ER @ 90 Degrees 65      IR @ 90 Degrees 50      Current status: met (AROM shoulder flexion: 166 degrees (improved 86 degrees), Abduction: 156 degrees (improved 82 degrees), Scaption: 150 degrees (improved 75 degrees), ER: T2 level bilaterally, IR: T6 level left, T8 level right) 4/25/17      3) Strength of left shoulder muscles to 4/5 or better for completing all reaching/carrying ADL's and to return to her UE lifting exercises at the gym.    Status at initial evaluation: Left shoulder flexion and abduction 2+/5, left shoulder IR 3+/5, left shoulder ER 3/5   Current status:met (Strength shoulder flexion: 4/5 (improved 1 2/3 grade), Abduction: 4/5 (improved 1 2/3 grade), Shoulder extension: 5/5, ER: 4+/5 (improved 1 1/3 grade), IR: 4+/5 (improved 1 grade), Elbow flexion: 5/5 (improved 1 grade), Elbow extension: 5/5 (improved 1 1/3 grade)) 4/25/17 , goal met      4) Evidence of return to normal ADL function as evidenced by FOTO score improvement of at least 20 pts and ability to reach forward and up in loaded ADL's. Status at initial evaluation: FOTO 51/100  Current status: Progressing (FOTO score improved 14 points to 65/100) 4/18/17  Status at D/C: FOTO 69/100, goal almost met        5) Independent with home/gym exercise program to include return to chest press a    nd UE strength exercises. Status at initial evaluation: Program to be initiated   Current status: Progressed (Pt is independent with prescribed HEP; however, she requires occassional VCs for proper form with clinic exercises and has not progressed to chest press at this time) 4/20/17  Status at D/C: Independent with HEP, will resume post rehab program, goal met    PLAN      [x]  Discharge due to: essential goals being met_      Kortney Cisse, PT 4/27/2017  10:52 AM    No future appointments.

## 2021-02-09 ENCOUNTER — HOSPITAL ENCOUNTER (OUTPATIENT)
Age: 86
Setting detail: OBSERVATION
Discharge: HOME | End: 2021-02-10
Attending: EMERGENCY MEDICINE | Admitting: INTERNAL MEDICINE
Payer: MEDICARE

## 2021-02-09 ENCOUNTER — APPOINTMENT (OUTPATIENT)
Dept: MRI IMAGING | Age: 86
End: 2021-02-09
Attending: EMERGENCY MEDICINE
Payer: MEDICARE

## 2021-02-09 ENCOUNTER — APPOINTMENT (OUTPATIENT)
Dept: GENERAL RADIOLOGY | Age: 86
End: 2021-02-09
Attending: EMERGENCY MEDICINE
Payer: MEDICARE

## 2021-02-09 ENCOUNTER — APPOINTMENT (OUTPATIENT)
Dept: CT IMAGING | Age: 86
End: 2021-02-09
Attending: EMERGENCY MEDICINE
Payer: MEDICARE

## 2021-02-09 DIAGNOSIS — G45.9 TIA (TRANSIENT ISCHEMIC ATTACK): Primary | ICD-10-CM

## 2021-02-09 LAB
ALBUMIN SERPL-MCNC: 4 G/DL (ref 3.5–4.7)
ALBUMIN/GLOB SERPL: 1.3 {RATIO}
ALP SERPL-CCNC: 60 U/L (ref 38–126)
ALT SERPL-CCNC: 18 U/L (ref 3–52)
ANION GAP SERPL CALC-SCNC: 8 MMOL/L
AST SERPL W P-5'-P-CCNC: 18 U/L (ref 14–74)
BASOPHILS # BLD: 0 K/UL (ref 0–0.1)
BASOPHILS NFR BLD: 0 % (ref 0–2)
BILIRUB SERPL-MCNC: 0.7 MG/DL (ref 0.2–1)
BUN SERPL-MCNC: 21 MG/DL (ref 9–21)
BUN/CREAT SERPL: 26
CA-I BLD-MCNC: 9.8 MG/DL (ref 8.5–10.5)
CHLORIDE SERPL-SCNC: 107 MMOL/L (ref 94–111)
CK MB SERPL-MCNC: 3.7 NG/ML (ref 5–25)
CK SERPL-CCNC: 71 U/L (ref 26–140)
CO2 SERPL-SCNC: 24 MMOL/L (ref 21–33)
CREAT SERPL-MCNC: 0.8 MG/DL (ref 0.7–1.2)
EOSINOPHIL # BLD: 0.1 K/UL (ref 0–0.4)
EOSINOPHIL NFR BLD: 1 % (ref 0–5)
ERYTHROCYTE [DISTWIDTH] IN BLOOD BY AUTOMATED COUNT: 14.1 % (ref 11.6–14.5)
GLOBULIN SER CALC-MCNC: 3.1 G/DL
GLUCOSE BLD STRIP.AUTO-MCNC: 118 MG/DL (ref 70–110)
GLUCOSE SERPL-MCNC: 118 MG/DL (ref 70–110)
HCT VFR BLD AUTO: 40.3 % (ref 35–45)
HGB BLD-MCNC: 12.3 G/DL (ref 12–16)
IMM GRANULOCYTES # BLD AUTO: 0 K/UL
IMM GRANULOCYTES NFR BLD AUTO: 0 %
LYMPHOCYTES # BLD: 2 K/UL (ref 0.9–3.6)
LYMPHOCYTES NFR BLD: 30 % (ref 21–52)
MCH RBC QN AUTO: 28.5 PG (ref 24–34)
MCHC RBC AUTO-ENTMCNC: 30.5 G/DL (ref 31–37)
MCV RBC AUTO: 93.3 FL (ref 74–97)
MONOCYTES # BLD: 0.5 K/UL (ref 0.05–1.2)
MONOCYTES NFR BLD: 7 % (ref 3–10)
NEUTS SEG # BLD: 4.1 K/UL (ref 1.8–8)
NEUTS SEG NFR BLD: 62 % (ref 40–73)
PERFORMED BY, TECHID: ABNORMAL
PLATELET # BLD AUTO: 214 K/UL (ref 135–420)
PMV BLD AUTO: 11.4 FL (ref 9.2–11.8)
POTASSIUM SERPL-SCNC: 4.2 MMOL/L (ref 3.2–5.1)
PROT SERPL-MCNC: 7.1 G/DL (ref 6.1–8.4)
RBC # BLD AUTO: 4.32 M/UL (ref 4.2–5.3)
SODIUM SERPL-SCNC: 139 MMOL/L (ref 135–145)
TROPONIN I SERPL-MCNC: <0.02 NG/ML (ref 0.02–0.05)
WBC # BLD AUTO: 6.7 K/UL (ref 4.6–13.2)

## 2021-02-09 PROCEDURE — 70450 CT HEAD/BRAIN W/O DYE: CPT

## 2021-02-09 PROCEDURE — 80053 COMPREHEN METABOLIC PANEL: CPT

## 2021-02-09 PROCEDURE — 80061 LIPID PANEL: CPT

## 2021-02-09 PROCEDURE — 36415 COLL VENOUS BLD VENIPUNCTURE: CPT

## 2021-02-09 PROCEDURE — 82962 GLUCOSE BLOOD TEST: CPT

## 2021-02-09 PROCEDURE — 99218 HC RM OBSERVATION: CPT

## 2021-02-09 PROCEDURE — 71045 X-RAY EXAM CHEST 1 VIEW: CPT

## 2021-02-09 PROCEDURE — 93005 ELECTROCARDIOGRAM TRACING: CPT

## 2021-02-09 PROCEDURE — 84484 ASSAY OF TROPONIN QUANT: CPT

## 2021-02-09 PROCEDURE — 70551 MRI BRAIN STEM W/O DYE: CPT

## 2021-02-09 PROCEDURE — 85025 COMPLETE CBC W/AUTO DIFF WBC: CPT

## 2021-02-09 PROCEDURE — 99285 EMERGENCY DEPT VISIT HI MDM: CPT

## 2021-02-09 PROCEDURE — 70547 MR ANGIOGRAPHY NECK W/O DYE: CPT

## 2021-02-09 PROCEDURE — 70544 MR ANGIOGRAPHY HEAD W/O DYE: CPT

## 2021-02-09 PROCEDURE — 82550 ASSAY OF CK (CPK): CPT

## 2021-02-09 PROCEDURE — 82553 CREATINE MB FRACTION: CPT

## 2021-02-09 PROCEDURE — 74011250637 HC RX REV CODE- 250/637: Performed by: EMERGENCY MEDICINE

## 2021-02-09 RX ORDER — ENOXAPARIN SODIUM 100 MG/ML
40 INJECTION SUBCUTANEOUS DAILY
Status: DISCONTINUED | OUTPATIENT
Start: 2021-02-10 | End: 2021-02-10 | Stop reason: HOSPADM

## 2021-02-09 RX ORDER — GUAIFENESIN 100 MG/5ML
81 LIQUID (ML) ORAL DAILY
Status: DISCONTINUED | OUTPATIENT
Start: 2021-02-10 | End: 2021-02-10 | Stop reason: HOSPADM

## 2021-02-09 RX ORDER — SODIUM CHLORIDE 0.9 % (FLUSH) 0.9 %
5-40 SYRINGE (ML) INJECTION EVERY 8 HOURS
Status: DISCONTINUED | OUTPATIENT
Start: 2021-02-09 | End: 2021-02-10 | Stop reason: HOSPADM

## 2021-02-09 RX ORDER — ACETAMINOPHEN 325 MG/1
650 TABLET ORAL
Status: DISCONTINUED | OUTPATIENT
Start: 2021-02-09 | End: 2021-02-10 | Stop reason: HOSPADM

## 2021-02-09 RX ORDER — ONDANSETRON 2 MG/ML
4 INJECTION INTRAMUSCULAR; INTRAVENOUS
Status: DISCONTINUED | OUTPATIENT
Start: 2021-02-09 | End: 2021-02-10 | Stop reason: HOSPADM

## 2021-02-09 RX ORDER — ACETAMINOPHEN 650 MG/1
650 SUPPOSITORY RECTAL
Status: DISCONTINUED | OUTPATIENT
Start: 2021-02-09 | End: 2021-02-10 | Stop reason: HOSPADM

## 2021-02-09 RX ORDER — SODIUM CHLORIDE 0.9 % (FLUSH) 0.9 %
5-40 SYRINGE (ML) INJECTION AS NEEDED
Status: DISCONTINUED | OUTPATIENT
Start: 2021-02-09 | End: 2021-02-10 | Stop reason: HOSPADM

## 2021-02-09 RX ORDER — CLOPIDOGREL BISULFATE 75 MG/1
300 TABLET ORAL
Status: COMPLETED | OUTPATIENT
Start: 2021-02-09 | End: 2021-02-09

## 2021-02-09 RX ORDER — GUAIFENESIN 100 MG/5ML
81 LIQUID (ML) ORAL DAILY
Status: DISCONTINUED | OUTPATIENT
Start: 2021-02-10 | End: 2021-02-09 | Stop reason: SDUPTHER

## 2021-02-09 RX ORDER — POLYETHYLENE GLYCOL 3350 17 G/17G
17 POWDER, FOR SOLUTION ORAL DAILY PRN
Status: DISCONTINUED | OUTPATIENT
Start: 2021-02-09 | End: 2021-02-10 | Stop reason: HOSPADM

## 2021-02-09 RX ORDER — PROMETHAZINE HYDROCHLORIDE 25 MG/1
12.5 TABLET ORAL
Status: DISCONTINUED | OUTPATIENT
Start: 2021-02-09 | End: 2021-02-10 | Stop reason: HOSPADM

## 2021-02-09 RX ORDER — METOPROLOL TARTRATE 50 MG/1
100 TABLET ORAL DAILY
Status: DISCONTINUED | OUTPATIENT
Start: 2021-02-10 | End: 2021-02-10 | Stop reason: HOSPADM

## 2021-02-09 RX ADMIN — Medication 10 ML: at 18:38

## 2021-02-09 RX ADMIN — Medication 10 ML: at 21:15

## 2021-02-09 RX ADMIN — CLOPIDOGREL BISULFATE 300 MG: 75 TABLET ORAL at 13:17

## 2021-02-09 NOTE — PROGRESS NOTES
Patient sitting in bed watching tv neuro check complete.  Patient tolerated water with no difficulty patient denies any need sat this tome CB in reach

## 2021-02-09 NOTE — ED TRIAGE NOTES
Pt. States her exercise class ended at 945 AM. Shortly after she states, she began to feel dizzy and having blurred vision. Pt. States that after that she remembers people who work at the gym coming at her with oxygen and she could not move her left side and she remembers frieda speech being slurred. EMS states on scene pt. Had left sided facial droop and left sided weakness. Pt. Also had some drooling and slurred speech. By the time pt. Arrived at the ER pts. Symptoms had resolved. PT. States she did have her first covid vaccine yesterday.

## 2021-02-09 NOTE — H&P
History and Physical    Patient: Dang Jones MRN: [id number]      YOB: 1932  Age: 80 y.o. Sex: female      Subjective:      Dang Jones is a 80 y.o. female  With a PMHx of HTN presents to the ED with complaints of sudden onset of stroke symptoms. Patient states that she was working out at [employer], when she had a sudden bout of blurred vision and weakness. Upon EMS arrival, they state that they noticed some mild slurred speech, left sided weakness, mild facial droop and some drooling at well. Patient informed EMS that she received the first dose of the COVID vaccine yesterday. In ED, CTof head remarkable for mild atropy only, CXR unremarkable, BP elevated at 152/106 otherwise VSS, EKG NSR rate @ 66, CBC & BMP unremarkable, troponin negative,     Pt examined at the bedside. A&OX3 with no sign of distress, discomfort, and pain. Pt satting 97% on RA. CNII-XII grossly intact, no neuro deficits noted. No acute events reported during admissions process  Will continue to assess patient. Plan of care as noted below. Discussed admission with patient and patient agrees with all questions answered    Code Status: Full Code  DVT prophylaxis:Lovenox 40mg daily SubQ  Medical Proxy: Rogelio Reynaga or Jo Cassidy [phone]    Approximate time spent on admission was 45 minutes. Patient will be admitted for TIA (transient ischemic attack) [G45.9]  to hospitalist service as Inpatient and evaluated daily via physical assessment, diagnostic testing, and laboratory assessment. Past Medical History:   Diagnosis Date    Hypertension      Past Surgical History:   Procedure Laterality Date    HX CATARACT REMOVAL      HX CHOLECYSTECTOMY      HX HYSTERECTOMY      HX KNEE REPLACEMENT      HX THYROIDECTOMY      PROSTHETIC IMPLANT NOS        History reviewed. No pertinent family history.   Social History     Tobacco Use    Smoking status: Never Smoker    Smokeless tobacco: Never Used Substance Use Topics    Alcohol use: No     Frequency: Never      Allergies   Allergen Reactions    Niacin Preparations Other (comments)     Hot flashes    Statins-Hmg-Coa Reductase Inhibitors Other (comments)     Nauseas, light headed     Immunizations are UTD per pt. Review of Systems:  - CONSTITUTIONAL: Denies  fatigue, weight loss, fever and chills. - HEENT: Denies changes in vision and hearing.    - RESPIRATORY: Denies SOB and cough. - CV: Denies palpitations and CP.     - GI: Denies abdominal pain, nausea, vomiting, diarrhea and constipation.    - : Denies dysuria and urinary frequency. - MSK: Denies myalgia and joint pain. - SKIN: Denies rash and pruritus.    - NEUROLOGICAL: Denies dizziness, weakness, headache and syncope. - PSYCHIATRIC: Denies recent changes in mood. Denies anxiety and depression. Objective:     Vitals:    02/09/21 1032 02/09/21 1131 02/09/21 1232 02/09/21 1301   BP: (!) 152/106 (!) 149/65 (!) 158/57 (!) 168/71   Pulse: 69 (!) 59 61 (!) 59   Resp: 16      Temp: 98.9 °F (37.2 °C)      SpO2: 97%   97%   Weight: 88.5 kg (195 lb)      Height: 5' 5\" (1.651 m)           Physical Exam:  - GENERAL: Alert and oriented x 3. No acute distress. Well-nourished.      - HEENT: EOMI. Anicteric. PERRLA,Moist mucous membranes. No scleral icterus. No cervical lymphadenopathy. Oropharynx moist without any lesions    -NECK: Supple, no tracheal deviation, no JVD, no significant lymphadenopathy, no thyromegaly noted. - LUNGS: Clear to auscultation bilaterally. No accessory muscle use. Chest symmetrical, No wheezing, rales, rhonchi noted. Appropriate respiratory effort.     - CARDIOVASCULAR: Regular rate and rhythm. No murmur, rubs, gallops, No edema appreciated. S1 & S2 audible. - ABDOMEN: Soft, non-tender and non-distended. No palpable masses. , lesions, hepatomegaly. Bowels active X4 quadrants. - SKIN: Warm, dry, intact, no bruising, lesions, or rashes noted.  Color appropriate for ethnicity.     - MUSCULOSKELETAL: Ambulates independently, no deformities, Full ROM     - NEUROLOGIC: No focal neurological deficits. CN II-XII grossly intact, Muscle strength 5/5, both U & L bilateral DTR in lower extremities 2+. - PSYCHIATRIC: Calm & Cooperative. Appropriate mood and affect. Recent Results (from the past 12 hour(s))   EKG, 12 LEAD, INITIAL    Collection Time: 02/09/21 10:28 AM   Result Value Ref Range    Ventricular Rate 66 BPM    Atrial Rate 67 BPM    P-R Interval 204 ms    QRS Duration 100 ms    Q-T Interval 406 ms    QTC Calculation (Bezet) 426 ms    Calculated P Axis 80 degrees    Calculated R Axis -12 degrees    Calculated T Axis 66 degrees    Diagnosis       Sinus rhythm  Ventricular premature complex  Abnormal R-wave progression, late transition     CBC WITH AUTOMATED DIFF    Collection Time: 02/09/21 10:40 AM   Result Value Ref Range    WBC 6.7 4.6 - 13.2 K/uL    RBC 4.32 4.20 - 5.30 M/uL    HGB 12.3 12.0 - 16.0 g/dL    HCT 40.3 35.0 - 45.0 %    MCV 93.3 74.0 - 97.0 FL    MCH 28.5 24.0 - 34.0 PG    MCHC 30.5 (L) 31.0 - 37.0 g/dL    RDW 14.1 11.6 - 14.5 %    PLATELET 868 468 - 178 K/uL    MPV 11.4 9.2 - 11.8 FL    NEUTROPHILS 62 40 - 73 %    LYMPHOCYTES 30 21 - 52 %    MONOCYTES 7 3 - 10 %    EOSINOPHILS 1 0 - 5 %    BASOPHILS 0 0 - 2 %    IMMATURE GRANULOCYTES 0 %    ABS. NEUTROPHILS 4.1 1.8 - 8.0 K/UL    ABS. LYMPHOCYTES 2.0 0.9 - 3.6 K/UL    ABS. MONOCYTES 0.5 0.05 - 1.2 K/UL    ABS. EOSINOPHILS 0.1 0.0 - 0.4 K/UL    ABS. BASOPHILS 0.0 0.0 - 0.1 K/UL    ABS. IMM.  GRANS. 0.0 K/UL   METABOLIC PANEL, COMPREHENSIVE    Collection Time: 02/09/21 10:40 AM   Result Value Ref Range    Sodium 139 135 - 145 mmol/L    Potassium 4.2 3.2 - 5.1 mmol/L    Chloride 107 94 - 111 mmol/L    CO2 24 21 - 33 mmol/L    Anion gap 8 mmol/L    Glucose 118 (H) 70 - 110 mg/dL    BUN 21 9 - 21 mg/dL    Creatinine 0.80 0.70 - 1.20 mg/dL    BUN/Creatinine ratio 26      GFR est AA >60 ml/min/1.73m2 GFR est non-AA >60 ml/min/1.73m2    Calcium 9.8 8.5 - 10.5 mg/dL    Bilirubin, total 0.7 0.2 - 1.0 mg/dL    AST (SGOT) 18 14 - 74 U/L    ALT (SGPT) 18 3 - 52 U/L    Alk. phosphatase 60 38 - 126 U/L    Protein, total 7.1 6.1 - 8.4 g/dL    Albumin 4.0 3.5 - 4.7 g/dL    Globulin 3.1 g/dL    A-G Ratio 1.3     TROPONIN I    Collection Time: 02/09/21 10:40 AM   Result Value Ref Range    Troponin-I, Qt. <0.02 (L) 0.02 - 0.05 ng/mL   CK-MB,QUANT.     Collection Time: 02/09/21 10:40 AM   Result Value Ref Range    CK - MB 3.7 ng/mL   CK    Collection Time: 02/09/21 10:40 AM   Result Value Ref Range    CK 71 26 - 140 U/L   GLUCOSE, POC    Collection Time: 02/09/21 10:48 AM   Result Value Ref Range    Glucose (POC) 118 (H) 70 - 110 mg/dL    Performed by Guera Rodriguez         Assessment/Plan:     TIA  -Teleneuro consult  -Lipid panel in AM  -CT of head completed and reviewed  -CXR completed and reviewed  -MRA of brain/neck completed results pending   -MRI of brain w/o contrast completed results pending  -Carotid dopplers  -tele monitoring  -Q4 neuro checks X4 occurrences  -PT/OT/ST consult  -Echo ordered      HTN  -current /71  -continue antihypertensives as ordered  -monitor BP per unit protocol  -cardiac diet      Signed By: Joan Bianchi NP     February 9, 2021

## 2021-02-09 NOTE — ED PROVIDER NOTES
EMERGENCY DEPARTMENT HISTORY AND PHYSICAL EXAM      Date: 2/9/2021  Patient Name: Berlin Cisneros    History of Presenting Illness     Chief Complaint   Patient presents with    Extremity Weakness       History Provided By: Patient and EMS    HPI: Berlin Cisneros, 80 y.o. female presents to the ED with complaints of sudden onset of stroke symptoms. Patient states that she was working out at [employer], when she had a sudden bout of blurred vision and weakness. Upon EMS arrival, they state that they noticed some mild slurred speech, left sided weakness, mild facial droop and some drooling at well. Patient informed EMS that she received the first dose of the COVID vaccine yesterday. Denies any current symptoms and says: \"I feel much better now. \"     Last known well time given is about 0930    There are no other complaints, changes, or physical findings at this time. PCP: Katy Babb MD    Current Facility-Administered Medications   Medication Dose Route Frequency Provider Last Rate Last Admin    clopidogreL (PLAVIX) tablet 300 mg  300 mg Oral NOW Ayala Gong MD        [START ON 2/10/2021] aspirin chewable tablet 81 mg  81 mg Oral DAILY Ayala Gong MD         Current Outpatient Medications   Medication Sig Dispense Refill    aspirin 81 mg chewable tablet Take 1 Tab by mouth daily. 30 Tab 2    UBIDECARENONE (COQ-10 PO) Take  by mouth.  metoprolol (LOPRESSOR) 100 mg tablet Take 100 mg by mouth daily.  echinacea 400 mg cap Take 1 Tab by mouth daily. Past History     Past Medical History:  Past Medical History:   Diagnosis Date    Hypertension        Past Surgical History:  Past Surgical History:   Procedure Laterality Date    HX CATARACT REMOVAL      HX CHOLECYSTECTOMY      HX HYSTERECTOMY      HX KNEE REPLACEMENT      HX THYROIDECTOMY      PROSTHETIC IMPLANT NOS         Family History:  History reviewed. No pertinent family history.     Social History:  Social History     Tobacco Use   • Smoking status: Never Smoker   • Smokeless tobacco: Never Used   Substance Use Topics   • Alcohol use: No     Frequency: Never   • Drug use: Not Currently       Allergies:  Allergies   Allergen Reactions   • Niacin Preparations Other (comments)     Hot flashes   • Statins-Hmg-Coa Reductase Inhibitors Other (comments)     Nauseas, light headed         Review of Systems   Review of Systems   Constitutional: Negative for diaphoresis, fatigue and fever.   HENT: Negative for ear pain, rhinorrhea and sore throat.    Eyes: Positive for visual disturbance. Negative for photophobia, pain and redness.   Respiratory: Negative for cough, chest tightness and shortness of breath.    Cardiovascular: Negative for chest pain, palpitations and leg swelling.   Gastrointestinal: Negative for abdominal pain, diarrhea, nausea and vomiting.   Endocrine: Negative for polydipsia, polyphagia and polyuria.   Genitourinary: Negative for frequency, hematuria and pelvic pain.   Musculoskeletal: Negative for arthralgias, back pain and joint swelling.   Skin: Negative for color change, pallor, rash and wound.   Allergic/Immunologic: Negative for environmental allergies, food allergies and immunocompromised state.   Neurological: Positive for facial asymmetry, speech difficulty and weakness. Negative for dizziness, seizures, syncope, numbness and headaches.            Hematological: Negative for adenopathy. Does not bruise/bleed easily.   Psychiatric/Behavioral: Negative for confusion and self-injury. The patient is not nervous/anxious.        Physical Exam   Physical Exam  Vitals signs and nursing note reviewed.   Constitutional:       General: She is not in acute distress.     Appearance: Normal appearance. She is normal weight. She is not ill-appearing.   HENT:      Head: Normocephalic and atraumatic.      Right Ear: Tympanic membrane, ear canal and external ear normal.      Left Ear: Tympanic membrane, ear canal and  external ear normal.      Nose: Nose normal.      Mouth/Throat:      Mouth: Mucous membranes are moist.      Pharynx: Oropharynx is clear. Eyes:      Extraocular Movements: Extraocular movements intact. Conjunctiva/sclera: Conjunctivae normal.      Pupils: Pupils are equal, round, and reactive to light. Neck:      Musculoskeletal: Normal range of motion and neck supple. No neck rigidity or muscular tenderness. Cardiovascular:      Rate and Rhythm: Normal rate and regular rhythm. Pulses: Normal pulses. Heart sounds: Normal heart sounds. No murmur. No friction rub. No gallop. Pulmonary:      Effort: Pulmonary effort is normal. No respiratory distress. Breath sounds: Normal breath sounds. Chest:      Chest wall: No tenderness. Abdominal:      General: Bowel sounds are normal.      Palpations: Abdomen is soft. There is no mass. Tenderness: There is no abdominal tenderness. Musculoskeletal: Normal range of motion. General: No swelling or tenderness. Skin:     General: Skin is warm. Coloration: Skin is not pale. Findings: No bruising or erythema. Neurological:      General: No focal deficit present. Mental Status: She is alert and oriented to person, place, and time. Cranial Nerves: Cranial nerves are intact. No cranial nerve deficit or facial asymmetry. Motor: Motor function is intact. No weakness. Coordination: Coordination is intact. Coordination normal.   Psychiatric:         Mood and Affect: Mood normal.         Behavior: Behavior normal.         Thought Content:  Thought content normal.         Judgment: Judgment normal.         Diagnostic Study Results     Labs -     Recent Results (from the past 12 hour(s))   CBC WITH AUTOMATED DIFF    Collection Time: 02/09/21 10:40 AM   Result Value Ref Range    WBC 6.7 4.6 - 13.2 K/uL    RBC 4.32 4.20 - 5.30 M/uL    HGB 12.3 12.0 - 16.0 g/dL    HCT 40.3 35.0 - 45.0 %    MCV 93.3 74.0 - 97.0 FL MCH 28.5 24.0 - 34.0 PG    MCHC 30.5 (L) 31.0 - 37.0 g/dL    RDW 14.1 11.6 - 14.5 %    PLATELET 529 737 - 977 K/uL    MPV 11.4 9.2 - 11.8 FL    NEUTROPHILS 62 40 - 73 %    LYMPHOCYTES 30 21 - 52 %    MONOCYTES 7 3 - 10 %    EOSINOPHILS 1 0 - 5 %    BASOPHILS 0 0 - 2 %    IMMATURE GRANULOCYTES 0 %    ABS. NEUTROPHILS 4.1 1.8 - 8.0 K/UL    ABS. LYMPHOCYTES 2.0 0.9 - 3.6 K/UL    ABS. MONOCYTES 0.5 0.05 - 1.2 K/UL    ABS. EOSINOPHILS 0.1 0.0 - 0.4 K/UL    ABS. BASOPHILS 0.0 0.0 - 0.1 K/UL    ABS. IMM. GRANS. 0.0 K/UL   METABOLIC PANEL, COMPREHENSIVE    Collection Time: 02/09/21 10:40 AM   Result Value Ref Range    Sodium 139 135 - 145 mmol/L    Potassium 4.2 3.2 - 5.1 mmol/L    Chloride 107 94 - 111 mmol/L    CO2 24 21 - 33 mmol/L    Anion gap 8 mmol/L    Glucose 118 (H) 70 - 110 mg/dL    BUN 21 9 - 21 mg/dL    Creatinine 0.80 0.70 - 1.20 mg/dL    BUN/Creatinine ratio 26      GFR est AA >60 ml/min/1.73m2    GFR est non-AA >60 ml/min/1.73m2    Calcium 9.8 8.5 - 10.5 mg/dL    Bilirubin, total 0.7 0.2 - 1.0 mg/dL    AST (SGOT) 18 14 - 74 U/L    ALT (SGPT) 18 3 - 52 U/L    Alk. phosphatase 60 38 - 126 U/L    Protein, total 7.1 6.1 - 8.4 g/dL    Albumin 4.0 3.5 - 4.7 g/dL    Globulin 3.1 g/dL    A-G Ratio 1.3     TROPONIN I    Collection Time: 02/09/21 10:40 AM   Result Value Ref Range    Troponin-I, Qt. <0.02 (L) 0.02 - 0.05 ng/mL   CK-MB,QUANT. Collection Time: 02/09/21 10:40 AM   Result Value Ref Range    CK - MB 3.7 ng/mL   CK    Collection Time: 02/09/21 10:40 AM   Result Value Ref Range    CK 71 26 - 140 U/L   GLUCOSE, POC    Collection Time: 02/09/21 10:48 AM   Result Value Ref Range    Glucose (POC) 118 (H) 70 - 110 mg/dL    Performed by Isaura Mercer        Radiologic Studies -   XR CHEST PORT   Final Result      No active cardiopulmonary disease. CT HEAD WO CONT   Final Result      1. No evidence for intracranial hemorrhage, mass effect or midline shift.    2. Periventricular microvascular ischemic change. 3. Mild atrophy. If there are continued symptoms, a MRI is recommended for further evaluation. Findings were given to Dr. Derick Sotelo at 10:37 AM.      MRA BRAIN WO CONT    (Results Pending)   MRI BRAIN WO CONT    (Results Pending)   MRA NECK WO CONT    (Results Pending)     CT Results  (Last 48 hours)               02/09/21 1025  CT HEAD WO CONT Final result    Impression:      1. No evidence for intracranial hemorrhage, mass effect or midline shift. 2. Periventricular microvascular ischemic change. 3. Mild atrophy. If there are continued symptoms, a MRI is recommended for further evaluation. Findings were given to Dr. Derick Sotelo at 10:37 AM.       Narrative:  EXAM: CT head       INDICATION: Stroke. COMPARISON: 12/16/2013. TECHNIQUE: Axial CT imaging of the head was performed without intravenous   contrast.         One or more dose reduction techniques were used on this CT: automated exposure   control, adjustment of the mAs and/or kVp according to patient size, and   iterative reconstruction techniques. The specific techniques used on this CT   exam have been documented in the patient's electronic medical record. Digital   Imaging and Communications in Medicine (DICOM) format image data are available   to nonaffiliated external healthcare facilities or entities on a secure, media   free, reciprocally searchable basis with patient authorization for at least a   12-month period after this study. _______________       FINDINGS:       BRAIN AND POSTERIOR FOSSA:   Minimal periventricular decreased attenuation. Gray-white matter interfaces are preserved. No intraparenchymal hemorrhage, mass effect or midline shift. The ventricular system is midline and within normal limits for size, symmetry,   and configuration. EXTRA-AXIAL SPACES AND MENINGES:   There is no evidence for extra-axial mass lesion or fluid collection.        CALVARIUM: Intact. SINUSES:    Clear. OTHER:    Orbits are grossly intact. Facial soft tissue are within normal limits. _______________               CXR Results  (Last 48 hours)               02/09/21 1116  XR CHEST PORT Final result    Impression:      No active cardiopulmonary disease. Narrative:  EXAM: CHEST RADIOGRAPH, SINGLE VIEW       CLINICAL INDICATION/HISTORY: Altered level of consciousness       COMPARISON: 12/16/2013       TECHNIQUE: Portable frontal view of the chest was obtained.        _______________       FINDINGS:       SUPPORT DEVICES: None. HEART AND MEDIASTINUM: Cardiomediastinal silhouette appears within normal   limits. Tortuous and atherosclerotic thoracic aorta. No central vascular   congestion. LUNGS AND PLEURAL SPACES: Left basilar subsegmental atelectasis. Lungs are   otherwise adequately aerated with no confluent airspace opacity. No pleural   effusion or pneumothorax. BONY THORAX AND SOFT TISSUES: No acute osseous abnormality. _______________                 Medical Decision Making and ED Course   I am the first provider for this patient. I reviewed the vital signs, available nursing notes, past medical history, past surgical history, family history and social history. Vital Signs-Reviewed the patient's vital signs. Patient Vitals for the past 12 hrs:   Temp Pulse Resp BP SpO2   02/09/21 1032 98.9 °F (37.2 °C) 69 16 (!) 152/106 97 %       EKG interpretation: (Preliminary): Performed at 1028; Read at 1029. EKG: normal sinus rhythm, Ventricular premature complex, Abnormal R-wave progression, late transition. Rate: 66      Records Reviewed: Nursing Notes and Ambulance Run Sheet    The patient presents with     ED Course:   Initial assessment performed. The patients presenting problems have been discussed, and they are in agreement with the care plan formulated and outlined with them.   I have encouraged them to ask questions as they arise throughout their visit. Provider Notes (Medical Decision Making):   Patient with initial symptoms of blurry vision, slurred speech, dizziness and left-sided weakness. Symptoms began while patient was exercising. EMS witnessed left facial droop, slurred speech and left-sided weakness. Symptoms are resolved on ED arrival.  Telemetry neuro was consulted and saw patient with recommendations on the chart. Patient being admitted for TIA work-up. Consultations:       Consultations:  Teleneurologist: Dr Preet Enamorado, consult on chart        Procedures         Critical Care Time:   CRITICAL CARE NOTE :    10:19 AM    Amount of Critical Care Time: __30__    IMPENDING DETERIORATION -CNS  ASSOCIATED RISK FACTORS - CNS Decompensation  MANAGEMENT- Bedside Assessment  INTERPRETATION -  CT Scan  INTERVENTIONS - Neurologic interventions   CASE REVIEW - Hospitalist/Intensivist and teleneurologist  TREATMENT RESPONSE -Stable  PERFORMED BY - Physician    NOTES   :  I have spent critical care time involved in lab review, consultations with specialist, family decision- making, bedside attention and documentation. This time excludes time spent in any separate billed procedures. During this entire length of time I was immediately available to the patient . Disposition     Disposition: Admit to telemetry          Diagnosis     Clinical Impression:   1. TIA (transient ischemic attack)        Attestations:    By signing my name below, I, Cynthia Vo, attest that this documentation has been prepared under the direction and in presence of Dr. Austen Barreto on 02/09/21. Electronically signed: Cynthia Vo, 02/09/21, 10:19 AM      Please note that this dictation was completed with [employer], the computer voice recognition software. Quite often unanticipated grammatical, syntax, homophones, and other interpretive errors are inadvertently transcribed by the computer software. Please disregard these errors.   Please excuse any errors that have escaped final proofreading. Thank you.

## 2021-02-10 ENCOUNTER — APPOINTMENT (OUTPATIENT)
Dept: NON INVASIVE DIAGNOSTICS | Age: 86
End: 2021-02-10
Attending: INTERNAL MEDICINE
Payer: MEDICARE

## 2021-02-10 ENCOUNTER — APPOINTMENT (OUTPATIENT)
Dept: VASCULAR SURGERY | Age: 86
End: 2021-02-10
Attending: INTERNAL MEDICINE
Payer: MEDICARE

## 2021-02-10 VITALS
RESPIRATION RATE: 17 BRPM | DIASTOLIC BLOOD PRESSURE: 62 MMHG | OXYGEN SATURATION: 99 % | HEART RATE: 60 BPM | HEIGHT: 65 IN | BODY MASS INDEX: 32.49 KG/M2 | TEMPERATURE: 97.5 F | SYSTOLIC BLOOD PRESSURE: 153 MMHG | WEIGHT: 195 LBS

## 2021-02-10 LAB
ATRIAL RATE: 67 BPM
CALCULATED P AXIS, ECG09: 80 DEGREES
CALCULATED R AXIS, ECG10: -12 DEGREES
CALCULATED T AXIS, ECG11: 66 DEGREES
CHOLEST SERPL-MCNC: 218 MG/DL
DIAGNOSIS, 93000: NORMAL
ECHO AO ROOT DIAM: 2.8 CM
ECHO AV AREA PEAK VELOCITY: 2.91 CM2
ECHO AV AREA PLAN/BSA: 1.34
ECHO AV AREA VTI: 2.62 CM2
ECHO AV AREA/BSA PEAK VELOCITY: 1.5 CM2/M2
ECHO AV AREA/BSA VTI: 1.3 CM2/M2
ECHO AV CUSP MM: 1.9 CM
ECHO AV MEAN GRADIENT: 3 MMHG
ECHO AV MEAN VELOCITY: 80.7 CM/S
ECHO AV PEAK GRADIENT: 6 MMHG
ECHO AV VTI: 26.1 CM
ECHO EST RA PRESSURE: 3 MMHG
ECHO LA AREA 2C: 13.1 CM2
ECHO LA AREA 4C: 15.5 CM2
ECHO LA MAJOR AXIS: 3.4 CM
ECHO LA MAJOR AXIS: 4.54 CM
ECHO LA TO AORTIC ROOT RATIO: 1.21
ECHO LV E' LATERAL VELOCITY: 6.64 CM/S
ECHO LV E' SEPTAL VELOCITY: 4.68 CM/S
ECHO LV EDV A2C: 104 CM3
ECHO LV EJECTION FRACTION A2C: 34 %
ECHO LV EJECTION FRACTION BIPLANE: 62.8 % (ref 55–100)
ECHO LV ESV A2C: 29.8 CM3
ECHO LV INTERNAL DIMENSION DIASTOLIC: 4.7 CM (ref 3.9–5.3)
ECHO LV INTERNAL DIMENSION SYSTOLIC: 3.1 CM
ECHO LV IVSD: 1.1 CM (ref 0.6–0.9)
ECHO LV MASS 2D: 175.8 G (ref 67–162)
ECHO LV MASS INDEX 2D: 89.8 G/M2 (ref 43–95)
ECHO LV POSTERIOR WALL DIASTOLIC: 1 CM (ref 0.6–0.9)
ECHO LVOT DIAM: 2.1 CM
ECHO LVOT PEAK GRADIENT: 4 MMHG
ECHO LVOT SV: 69 CM3
ECHO LVOT VTI: 19.8 CM
ECHO LVOT VTI: 19.8 CM
ECHO MV A VELOCITY: 89.2 CM/S
ECHO MV AREA PHT: 3.14 CM2
ECHO MV E DECELERATION TIME (DT): 239 MS
ECHO MV E VELOCITY: 92.2 CM/S
ECHO MV E/A RATIO: 1.03
ECHO MV E/E' LATERAL: 13.89
ECHO MV E/E' RATIO (AVERAGED): 16.79
ECHO MV E/E' SEPTAL: 19.7
ECHO MV PRESSURE HALF TIME (PHT): 70 MS
ECHO PV REGURGITANT MAX VELOCITY: 105 CM/S
ECHO PV REGURGITANT MAX VELOCITY: 125 CM/S
ECHO PV REGURGITANT MAX VELOCITY: 214 CM/S
ECHO RA AREA 4C: 13.8 CM2
ECHO RA MAJOR AXIS: 4.77 CM
ECHO RIGHT VENTRICULAR SYSTOLIC PRESSURE (RVSP): 21 MMHG
ECHO RV INTERNAL DIMENSION: 3.1 CM
ECHO RV TAPSE: 2.44 CM (ref 1.5–2)
ECHO TV MEAN GRADIENT: 18 MMHG
HDLC SERPL-MCNC: 34 MG/DL
HDLC SERPL: 6.4 {RATIO} (ref 0–5)
LA VOL DISK BP: 40.7 CM3 (ref 22–52)
LDLC SERPL CALC-MCNC: 131.4 MG/DL (ref 0–100)
LEFT CCA DIST DIAS: 24.2 CM/S
LEFT CCA DIST SYS: 99.4 CM/S
LEFT CCA MID DIAS: 15.5 CM/S
LEFT CCA MID SYS: 101 CM/S
LEFT CCA PROX DIAS: 13.7 CM/S
LEFT CCA PROX SYS: 110 CM/S
LEFT ECA SYS: 91.9 CM/S
LEFT ICA DIST DIAS: 28 CM/S
LEFT ICA DIST SYS: 96.3 CM/S
LEFT ICA MID DIAS: 26.7 CM/S
LEFT ICA MID SYS: 95.7 CM/S
LEFT ICA PROX DIAS: 23 CM/S
LEFT ICA PROX SYS: 83.9 CM/S
LEFT ICA/CCA SYS: 0.97
LEFT SUBCLAVIAN SYS: 128 CM/S
LEFT VERTEBRAL DIAS: 18.2 CM/S
LEFT VERTEBRAL SYS: 63.4 CM/S
LIPID PROFILE,FLP: ABNORMAL
LVOT MG: 2 MMHG
MV DEC SLOPE: 3850 MM/S2
MV DEC SLOPE: 3850 MM/S2
P-R INTERVAL, ECG05: 204 MS
Q-T INTERVAL, ECG07: 406 MS
QRS DURATION, ECG06: 100 MS
QTC CALCULATION (BEZET), ECG08: 426 MS
RIGHT CCA DIST DIAS: 19.6 CM/S
RIGHT CCA DIST SYS: 95.3 CM/S
RIGHT CCA MID DIAS: 27.3 CM/S
RIGHT CCA MID SYS: 107 CM/S
RIGHT CCA PROX DIAS: 28.7 CM/S
RIGHT CCA PROX SYS: 117 CM/S
RIGHT ECA SYS: 113 CM/S
RIGHT ICA DIST DIAS: 31.5 CM/S
RIGHT ICA DIST SYS: 118 CM/S
RIGHT ICA MID DIAS: 24.5 CM/S
RIGHT ICA MID SYS: 91.8 CM/S
RIGHT ICA PROX DIAS: 21 CM/S
RIGHT ICA PROX SYS: 108 CM/S
RIGHT ICA/CCA SYS: 1.24
RIGHT SUBCLAVIAN SYS: 180 CM/S
RIGHT VERTEBRAL DIAS: 14.7 CM/S
RIGHT VERTEBRAL SYS: 65.1 CM/S
TRIGL SERPL-MCNC: 263 MG/DL (ref ?–150)
VENTRICULAR RATE, ECG03: 66 BPM
VLDLC SERPL CALC-MCNC: 52.6 MG/DL

## 2021-02-10 PROCEDURE — 74011250637 HC RX REV CODE- 250/637: Performed by: NURSE PRACTITIONER

## 2021-02-10 PROCEDURE — 74011250637 HC RX REV CODE- 250/637: Performed by: EMERGENCY MEDICINE

## 2021-02-10 PROCEDURE — 97165 OT EVAL LOW COMPLEX 30 MIN: CPT

## 2021-02-10 PROCEDURE — 99218 HC RM OBSERVATION: CPT

## 2021-02-10 PROCEDURE — 93880 EXTRACRANIAL BILAT STUDY: CPT

## 2021-02-10 PROCEDURE — 74011250636 HC RX REV CODE- 250/636: Performed by: INTERNAL MEDICINE

## 2021-02-10 PROCEDURE — 96374 THER/PROPH/DIAG INJ IV PUSH: CPT

## 2021-02-10 PROCEDURE — 96372 THER/PROPH/DIAG INJ SC/IM: CPT

## 2021-02-10 PROCEDURE — 97161 PT EVAL LOW COMPLEX 20 MIN: CPT

## 2021-02-10 PROCEDURE — 92610 EVALUATE SWALLOWING FUNCTION: CPT

## 2021-02-10 RX ORDER — CLOPIDOGREL BISULFATE 75 MG/1
75 TABLET ORAL DAILY
Qty: 30 TAB | Refills: 2 | Status: SHIPPED | OUTPATIENT
Start: 2021-02-10

## 2021-02-10 RX ORDER — EZETIMIBE 10 MG/1
10 TABLET ORAL DAILY
Qty: 30 TAB | Refills: 2 | Status: SHIPPED | OUTPATIENT
Start: 2021-02-10

## 2021-02-10 RX ORDER — CLOPIDOGREL BISULFATE 75 MG/1
75 TABLET ORAL DAILY
Status: DISCONTINUED | OUTPATIENT
Start: 2021-02-11 | End: 2021-02-10 | Stop reason: HOSPADM

## 2021-02-10 RX ADMIN — ENOXAPARIN SODIUM 40 MG: 40 INJECTION SUBCUTANEOUS at 10:03

## 2021-02-10 RX ADMIN — Medication 10 ML: at 15:17

## 2021-02-10 RX ADMIN — METOPROLOL TARTRATE 100 MG: 50 TABLET, FILM COATED ORAL at 10:03

## 2021-02-10 RX ADMIN — ASPIRIN 81 MG: 81 TABLET, CHEWABLE ORAL at 10:03

## 2021-02-10 RX ADMIN — Medication 10 ML: at 05:29

## 2021-02-10 NOTE — PROGRESS NOTES
Pt. Resting in bed. Reoriented to call light. Fresh ice water at bedside. Denies any needs at this time. PIV flushed and locked.  CBWR

## 2021-02-10 NOTE — ED NOTES
TRANSFER - OUT REPORT:    Verbal report given to LORNA Dugan(name) on [facility]  being transferred to [address] [place] [address](unit) for routine progression of care       Report consisted of patients Situation, Background, Assessment and   Recommendations(SBAR). Information from the following report(s) SBAR, ED Summary, MAR, Recent Results and Cardiac Rhythm NSR was reviewed with the receiving nurse. Lines:       Opportunity for questions and clarification was provided. Patient transported with:   Monitor  Registered Nurse       Pt. To go for MRI prior to being admitted.

## 2021-02-10 NOTE — PROGRESS NOTES
OCCUPATIONAL THERAPY EVALUATION/DISCHARGE    Patient: Milton Rubio (89 y.o. female)  Date: 2/10/2021  Primary Diagnosis: TIA (transient ischemic attack) [G45.9]       Precautions: fall     PLOF: lives at home alone and had been I with all ADLs and mobility. Had been in workout class at the [facility] when [facility] [place] [facility]\". Now reports she is \"fine\" and wants to go home    ASSESSMENT AND RECOMMENDATIONS:  Based on the objective data described below, the patient presents with minimal declines in mobility and ADLs. Skilled occupational therapy is not indicated at this time. Discharge Recommendations: None  Further Equipment Recommendations for Discharge: N/A      SUBJECTIVE:   Patient stated Im ready to get out of here.     OBJECTIVE DATA SUMMARY:     Past Medical History:   Diagnosis Date    Hypertension      Past Surgical History:   Procedure Laterality Date    HX CATARACT REMOVAL      HX CHOLECYSTECTOMY      HX HYSTERECTOMY      HX KNEE REPLACEMENT      HX THYROIDECTOMY      PROSTHETIC IMPLANT NOS       Barriers to Learning/Limitations: None  Compensate with: visual, verbal, tactile, kinesthetic cues/model    Home Situation:   Home Situation  Home Environment: Private residence  One/Two Story Residence: One story  Living Alone: No  Support Systems: Family member(s)  Patient Expects to be Discharged to[de-identified] Private residence  Current DME Used/Available at Home: None  [x]     Right hand dominant   []     Left hand dominant    Cognitive/Behavioral Status:    A&O x 4; follows multi step commands              Skin: intact  Edema: none noted   Vision/Perceptual:       WFLs                               Coordination: BUE   WFLs             Balance:   good     Strength: BUE  4/5                 Tone & Sensation: BUE  intact   Range of Motion: BUE  WFLs      Functional Mobility and Transfers for ADLs:  Bed Mobility:   I            Transfers:   I      ADL Assessment:   Pt seen demonstrating I mobility in room; Full gross and fine motor mobility in B UE        Pain:  Pain level pre-treatment: 0/10   Pain level post-treatment: 0/10   Pain Intervention(s): Medication (see MAR); Rest, Ice, Repositioning   Response to intervention: Nurse notified, See doc flow      Please refer to the flowsheet for vital signs taken during this treatment. After treatment:   [x]  Patient left in no apparent distress sitting up in chair  []  Patient left in no apparent distress in bed  []  Call bell left within reach  []  Nursing notified  []  Caregiver present  []  Bed alarm activated    COMMUNICATION/EDUCATION:   [x]      Role of Occupational Therapy in the acute care setting  []      Home safety education was provided and the patient/caregiver indicated understanding. []      Patient/family have participated as able and agree with findings and recommendations. []      Patient is unable to participate in plan of care at this time. Thank you for this referral.  Rubén Chilel MS, ,OTR/L          Eval Complexity: History: LOW Complexity : Brief history review ; Examination: LOW Complexity : 1-3 performance deficits relating to physical, cognitive , or psychosocial skils that result in activity limitations and / or participation restrictions ;    Decision Making:LOW Complexity : No comorbidities that affect functional and no verbal or physical assistance needed to complete eval tasks

## 2021-02-10 NOTE — DISCHARGE SUMMARY
Discharge Summary     Patient: Sav Martell MRN: [id number]     YOB: 1932  Age: 80 y.o. Sex: female       Admit Date: 2/9/2021    Discharge Date: 2/10/2021      Admission Diagnoses: TIA (transient ischemic attack) [G45.9]    Discharge Diagnoses:   Problem List as of 2/10/2021 Never Reviewed          Codes Class Noted - Resolved    TIA (transient ischemic attack) ICD-10-CM: G45.9  ICD-9-CM: 435.9  2/9/2021 - Present        Hypertension ICD-10-CM: I10  ICD-9-CM: 401.9  Unknown - Present        Dyslipidemia ICD-10-CM: E78.5  ICD-9-CM: 272.4  12/18/2013 - Present        CVA (cerebral infarction) ICD-10-CM: I63.9  ICD-9-CM: 434.91  12/18/2013 - Present        After-cataract, obscuring vision ICD-10-CM: H26.499  ICD-9-CM: 366.53  4/18/2013 - Present        RESOLVED: After-cataract, obscuring vision ICD-10-CM: H26.499  ICD-9-CM: 366.53  12/19/2012 - 4/18/2013                 Hospital Course: Sav Martell is a 80 y.o. female  With a PMHx of HTN presents to the ED with complaints of sudden onset of stroke symptoms. Patient states that she was working out at [employer], when she had a sudden bout of blurred vision and weakness. Upon EMS arrival, they state that they noticed some mild slurred speech, left sided weakness, mild facial droop and some drooling at well. Patient informed EMS that she received the first dose of the COVID vaccine yesterday. During this admission patient was examined and treated for possible TIA. Pt examined at the bedside. A&OX3 with no sign of distress, discomfort, and pain. Pt satting 97% on RA. CNII-XII grossly intact, no neuro deficits noted. No acute events reported overnight. Will continue to assess patient. Plan of care as noted below. Home with Plavix 75mg daily and Zetia 10mg daily. Discussed plan to discharge with patient and patient agrees with all questions answered.     Approximate time spent on discharge was approx 45 minutes to include coordination of care and discharge planning. Wt Readings from Last 3 Encounters:   02/10/21 88.5 kg (195 lb)   12/18/13 95.5 kg (210 lb 9.6 oz)   04/19/13 96.2 kg (212 lb)     Temp Readings from Last 3 Encounters:   02/10/21 97.5 °F (36.4 °C)   12/18/13 98.4 °F (36.9 °C)   04/19/13 96.7 °F (35.9 °C)     BP Readings from Last 3 Encounters:   02/10/21 (!) 153/62   12/18/13 148/49   04/19/13 151/63     Pulse Readings from Last 3 Encounters:   02/10/21 60   12/18/13 77   04/19/13 71       Lab Results   Component Value Date/Time    WBC 6.7 02/09/2021 10:40 AM    HGB 12.3 02/09/2021 10:40 AM    HCT 40.3 02/09/2021 10:40 AM    PLATELET 060 11/91/6609 10:40 AM    MCV 93.3 02/09/2021 10:40 AM     Lab Results   Component Value Date/Time    Sodium 139 02/09/2021 10:40 AM    Potassium 4.2 02/09/2021 10:40 AM    Chloride 107 02/09/2021 10:40 AM    CO2 24 02/09/2021 10:40 AM    Anion gap 8 02/09/2021 10:40 AM    Glucose 118 (H) 02/09/2021 10:40 AM    BUN 21 02/09/2021 10:40 AM    Creatinine 0.80 02/09/2021 10:40 AM    BUN/Creatinine ratio 26 02/09/2021 10:40 AM    GFR est AA >60 02/09/2021 10:40 AM    GFR est non-AA >60 02/09/2021 10:40 AM    Calcium 9.8 02/09/2021 10:40 AM        Significant Diagnostic Studies:   Mra Brain Wo Cont    Result Date: 2/10/2021  1. Evidence of intracranial atherosclerosis with stenosis most prominent involving the bilateral posterior cerebral arteries, progressed from previous. 2. No other significant intracranial arterial vascular abnormality is appreciated. Mra Neck Wo Cont    Result Date: 2/10/2021  No convincing hemodynamically significant cervical vascular stenosis on this limited non contrast examination. Mri Brain Wo Cont    Result Date: 2/10/2021  1. No acute hemorrhage or acute infarction. 2. Mild atrophy and ischemic white matter change, more evident than previous.  3. Chronic microbleed right cerebellar hemisphere and possibly left parieto-occipital region in cortex, nonspecific, possibly new from previous, most often the result of hypertension. 4. No other significant intracranial abnormality or change from previous. Ct Head Wo Cont    Result Date: 2/9/2021  1. No evidence for intracranial hemorrhage, mass effect or midline shift. 2. Periventricular microvascular ischemic change. 3. Mild atrophy. If there are continued symptoms, a MRI is recommended for further evaluation. Findings were given to Dr. Frankie Calderon at 10:37 AM.    [facility] November Chest Port    Result Date: 2/9/2021  No active cardiopulmonary disease. Discharge Medications:   Current Discharge Medication List      CONTINUE these medications which have NOT CHANGED    Details   aspirin 81 mg chewable tablet Take 1 Tab by mouth daily. Qty: 30 Tab, Refills: 2      UBIDECARENONE (COQ-10 PO) Take  by mouth.      metoprolol (LOPRESSOR) 100 mg tablet Take 100 mg by mouth daily. echinacea 400 mg cap Take 1 Tab by mouth daily. Plavix 75mg PO Daily; Zetia 10mg PO Daily     Disposition: home  Discharge Condition: Good  Activity: Activity as Tolerated.   Diet: Resume previous diet  Wound Care: None needed  Consults: None    PLAN DURING HOSPITALIZATION:     TIA  -Teleneuro consult  -Lipid panel in AM  -CT of head completed and reviewed  -CXR completed and reviewed  -MRA of brain/neck completed results pending   -MRI of brain w/o contrast completed results pending  -Carotid dopplers  -tele monitoring  -Q4 neuro checks X4 occurrences  -PT/OT/ST consult  -Echo EF 63%     HTN  -154/50  -continue antihypertensives as ordered  -monitor BP per unit protocol  -cardiac diet  Signed By: Nadir Maria MD     February 10, 2021

## 2021-02-10 NOTE — PROGRESS NOTES
SPEECH LANGUAGE PATHOLOGY BEDSIDE SWALLOW EVALUATION AND DISCHARGE    Patient: José Cronin (84 y.o. female)  Date: 2/10/2021  Primary Diagnosis: TIA (transient ischemic attack) [G45.9]        Precautions: aspiration     PLOF: Patient denies speech or swallowing deficits     ASSESSMENT :  Clinical beside swallow eval completed per MD orders. Pt A&Ox4. Functional communication. Intelligibility >90%. Cognitive-linguistic function appears intact. OM examination revealed oral motor structures functional for mastication and deglutition. Presented with thin liquid. Patient's lunch tray present with 50-75% of meal consumed. Exhibited adequate bolus cohesion, manipulation and A-P transit. Further exhibited adequate swallow timing/reflex and hyolaryngeal excursion. Pt able to manipulate and clear with 0 clinical s/s aspiration and/or oropharyngeal dysphagia. Pt safe for regular solid, thin liquid diet. 0 formal ST needs for dysphagia indicated at this time. SLP educated pt on role of speech therapist in current setting with re: speech/swallow; verbalized comprehension. SLP available for re-evaluation if indicated by MD. Results d/w RNSALMA. PLAN :  Recommendations and Planned Interventions:  No formal ST needs ID'd for dysphagia. Eval only.    Discharge Recommendations: home      SUBJECTIVE:   Patient stated that she has no difficulty with speech or swallowing currently or in the past     OBJECTIVE:     Past Medical History:   Diagnosis Date    Hypertension      Past Surgical History:   Procedure Laterality Date    HX CATARACT REMOVAL      HX CHOLECYSTECTOMY      HX HYSTERECTOMY      HX KNEE REPLACEMENT      HX THYROIDECTOMY      PROSTHETIC IMPLANT NOS       Home Situation:   Home Situation  Home Environment: Private residence  # Steps to Enter: 3  Rails to Enter: Yes  Hand Rails : Bilateral  One/Two Story Residence: One story  Living Alone: No  Support Systems: Family member(s)  Patient Expects to be Discharged to[de-identified] Private residence  Current DME Used/Available at Home: None    Diet prior to admission: regular, thin  Current Diet:  regular, thin     Cognitive and Communication Status:  Neurologic State: Alert  Orientation Level: Oriented X4  Cognition: Appropriate decision making, Appropriate for age attention/concentration, Follows commands           Oral Assessment:  Oral Assessment  Labial: No impairment  Dentition: Natural  Lingual: No impairment  P.O. Trials:     Vocal quality prior to P.O.: No impairment  Consistency Presented: Thin liquid  How Presented: Self-fed/presented;Cup/sip     Bolus Acceptance: No impairment        Propulsion: No impairment  Oral Residue: None  Initiation of Swallow: No impairment     Aspiration Signs/Symptoms: None                        PAIN:  Pain level pre-treatment: 0/10   Pain level post-treatment: 0/10   Pain Intervention(s): N/A  Response to intervention: N/A    After evaluation:   []            Patient left in no apparent distress sitting up in chair  [x]            Patient left in no apparent distress in bed  [x]            Call bell left within reach  [x]            Nursing notified  []            Family present  []            Caregiver present  []            Bed alarm activated      COMMUNICATION/EDUCATION:   [x]            Aspiration precautions; swallow safety; compensatory techniques. []            Patient/family have participated as able in goal setting and plan of care. []            Patient/family agree to work toward stated goals and plan of care. []            Patient understands intent and goals of therapy; neutral about participation. []            Patient unable to participate in goal setting/plan of care; educ ongoing with interdisciplinary staff  []         Posted safety precautions in patient's room.     Thank you for this referral.  Bridgett Iglesias MA, CCC-SLP    Time Calculation: 15 mins   Problem: Dysphagia (Adult)  Goal: *Acute Goals and Plan of Care (Insert Text)  Description: Eval only   Outcome: Progressing Towards Goal

## 2021-02-10 NOTE — PROGRESS NOTES
Reason for Admission:   Chart reviewed and noted pt presented for the \"ED with complaints of sudden onset of stroke symptoms. Patient states that she was working out at [employer], when she had a sudden bout of blurred vision and weakness. Upon EMS arrival, they state that they noticed some mild slurred speech, left sided weakness, mild facial droop and some drooling at well. Patient informed EMS that she received the first dose of the COVID vaccine yesterday\". PMH HTN. dx: TIA                RUR Score:          NA           Plan for utilizing home health: TBD         PCP: First and Last name:  James Whitt   Name of Practice:    Are you a current patient: Yes/No:    Approximate date of last visit:    Can you participate in a virtual visit with your PCP:                     Current Advanced Directive/Advance Care Plan: has ACP on file                         Transition of Care Plan:    Anticipate home when medically stable. Care Management Interventions  PCP Verified by CM:  Yes  Transition of Care Consult (CM Consult): Discharge Planning

## 2021-02-10 NOTE — DISCHARGE INSTRUCTIONS
Patient Education        Transient Ischemic Attack: Care Instructions  Your Care Instructions     A transient ischemic attack (TIA) is when blood flow to a part of your brain is blocked for a short time. A TIA is like a stroke but usually lasts only a few minutes. A TIA does not cause lasting brain damage. Any vision problems, slurred speech, or other symptoms usually go away in 10 to 20 minutes. But they may last for up to 24 hours. TIAs are often warning signs of a stroke. Some people who have a TIA may have a stroke in the future. A stroke can cause symptoms like those of a TIA. But a stroke causes lasting damage to your brain. You can take steps to help prevent a stroke. One thing you can do is get early treatment. If you have other new symptoms, or if your symptoms do not get better, go back to the emergency room or call your doctor right away. Getting treatment right away may prevent long-term brain damage caused by a stroke. The doctor has checked you carefully, but problems can develop later. If you notice any problems or new symptoms, get medical treatment right away. Follow-up care is a key part of your treatment and safety. Be sure to make and go to all appointments, and call your doctor if you are having problems. It's also a good idea to know your test results and keep a list of the medicines you take. How can you care for yourself at home? Medicines    · Be safe with medicines. Take your medicines exactly as prescribed. Call your doctor if you think you are having a problem with your medicine.     · If you take a blood thinner, such as aspirin, be sure you get instructions about how to take your medicine safely.  Blood thinners can cause serious bleeding problems.     · Call your doctor if you are not able to take your medicines for any reason.     · Do not take any over-the-counter medicines or herbal products without talking to your doctor first.     · If you take birth control pills or hormone therapy, talk to your doctor. Ask if these treatments are right for you. Lifestyle changes    · Do not smoke. If you need help quitting, talk to your doctor about stop-smoking programs and medicines.     · Be active. If your doctor recommends it, get more exercise. Walking is a good choice. Bit by bit, increase the amount you walk every day. Try for at least 30 minutes on most days of the week. You also may want to swim, bike, or do other activities.     · Eat heart-healthy foods. These include fruits, vegetables, high-fiber foods, lean meats, beans, peas, nuts, seeds, and soy products, and foods that are low in sodium, saturated fat, and trans fat.     · Stay at a healthy weight. Lose weight if you need to.     · Limit alcohol to 2 drinks a day for men and 1 drink a day for women. Staying healthy    · Manage other health problems such as diabetes, high blood pressure, and high cholesterol.     · Get the flu vaccine every year. When should you call for help? Call 911 anytime you think you may need emergency care. For example, call if:    · You have new or worse symptoms of a stroke. These may include:  ? Sudden numbness, tingling, weakness, or loss of movement in your face, arm, or leg, especially on only one side of your body. ? Sudden vision changes. ? Sudden trouble speaking. ? Sudden confusion or trouble understanding simple statements. ? Sudden problems with walking or balance. ? A sudden, severe headache that is different from past headaches. Call 911 even if these symptoms go away in a few minutes.     · You feel like you are having another TIA. Watch closely for changes in your health, and be sure to contact your doctor if you have any problems. Where can you learn more? Go to http://www.miacosa.com/  Enter I231 in the search box to learn more about \"Transient Ischemic Attack: Care Instructions. \"  Current as of: March 4, 2020               Content Version: 12.6  © 7867-3182 [employer].   Care instructions adapted under license by [employer] (which disclaims liability or warranty for this information). If you have questions about a medical condition or this instruction, always ask your healthcare professional. [employer] disclaims any warranty or liability for your use of this information.         Patient Education        Learning About Transient Ischemic Attack (TIA)  What is a TIA?  A transient ischemic attack (TIA) means that the blood flow to a part of the brain is blocked for a short time. A TIA feels like a stroke but usually lasts only 10 to 20 minutes. Unlike a stroke, a TIA does not cause lasting brain damage.  A TIA is usually caused by a blood clot that blocks blood flow in the brain. A blood clot can form in another part of the body (often the heart) and travel through the bloodstream to the brain. When blood flow to part of the brain is blocked, the brain cells in that area are affected within seconds. This causes symptoms in parts of the body controlled by those brain cells. When the blood clot dissolves, blood flow returns, and the symptoms go away.  Blood clots can be the result of hardening of the arteries (atherosclerosis) or a heart attack.  Sometimes a TIA is caused by a sharp drop in blood pressure that reduces blood flow to the brain. This is called a \"low-flow\" TIA. It is not as common as a TIA caused by a blood clot.  What happens after a TIA?  TIA is a serious warning sign of a possible stroke in the future. If you have other medical conditions such as coronary artery disease or atherosclerosis, you may also have an increased risk for a heart attack. Talk to your doctor about your risk. Understanding your risk will help you and your doctor plan your treatment options.  You can do a lot to lower your chance of having another TIA or a stroke. Medicines can help, and you may also need to make lifestyle changes.  What  are the symptoms? Symptoms of a TIA are the same as symptoms of a stroke. But symptoms of a TIA don't last very long. Most of the time, they go away in 10 to 20 minutes. They may include:  · Sudden numbness, tingling, weakness, or loss of movement in your face, arm, or leg, especially on only one side of your body. · Sudden vision changes. · Sudden trouble speaking. · Sudden confusion or trouble understanding simple statements. · Sudden problems with walking or balance. If you have any of these symptoms, call 911 or other emergency services right away. Ask your family, friends, and coworkers to learn the signs of a TIA. They may notice these signs before you do. Make sure they know to call 911 if these signs appear. How is a TIA treated? If you've had a TIA, you may need more testing and treatment after you get checked by your doctor. If you have a high risk of stroke, you may have to stay in the hospital for treatment. Your treatment for a TIA may include taking medicines to prevent blood clots or a stroke, or having surgery to reopen narrow arteries. How can you prevent another TIA? · Work with your doctor to treat any health problems you have. High blood pressure, high cholesterol, atrial fibrillation, and diabetes all raise your chances of having a stroke. · Be safe with medicines. Take your medicine exactly as prescribed. Call your doctor if you think you are having a problem with your medicine. · Have a healthy lifestyle. ? Do not smoke or allow others to smoke around you. If you need help quitting, talk to your doctor about stop-smoking programs and medicines. These can increase your chances of quitting for good. Smoking makes a stroke more likely. ? Limit alcohol to 2 drinks a day for men and 1 drink a day for women. ? Lose weight if you need to. A healthy weight will help you keep your heart and body healthy. ? Be active.  Ask your doctor what type and level of activity are safe for you.  ? Eat heart-healthy foods, like fruits, vegetables, and high-fiber foods. Follow-up care is a key part of your treatment and safety. Be sure to make and go to all appointments, and call your doctor if you are having problems. It's also a good idea to know your test results and keep a list of the medicines you take. Where can you learn more? Go to http://www.gray.com/  Enter Z925 in the search box to learn more about \"Learning About Transient Ischemic Attack (TIA). \"  Current as of: March 4, 2020               Content Version: 12.6  © 6106-8117 [employer]. Care instructions adapted under license by [employer] (which disclaims liability or warranty for this information). If you have questions about a medical condition or this instruction, always ask your healthcare professional. [address] any warranty or liability for your use of this information.

## 2021-02-10 NOTE — PROGRESS NOTES
Problem: Mobility Impaired (Adult and Pediatric)  Goal: *Acute Goals and Plan of Care (Insert Text)  Description: Pt is (I) with gait and states she will feel comfortable doing stairs at home. PLOF: Community ambulator who did not use an AD and who was (I) with ADLs. Outcome: Resolved/Met     Problem: Patient Education: Go to Patient Education Activity  Goal: Patient/Family Education  Outcome: Resolved/Met   PHYSICAL THERAPY EVALUATION AND DISCHARGE    Patient: Sami Trejo (66 y.o. female)  Date: 2/10/2021  Primary Diagnosis: TIA (transient ischemic attack) [G45.9]        Precautions: N/A       ASSESSMENT :  Based on the objective data described below, the patient presents with possible TIA. She states her symptoms have resolved and she has not experienced them since admission. She performs mobility without assistance and does not have any complaints. She can return home once medically stable. Patient does not require further skilled intervention at this level of care. PLAN :  Recommendations and Planned Interventions:   No formal PT needs identified at this time. Discharge Recommendations: None  Further Equipment Recommendations for Discharge: N/A     SUBJECTIVE:   Patient stated I feel great.     OBJECTIVE DATA SUMMARY:     Past Medical History:   Diagnosis Date    Hypertension      Past Surgical History:   Procedure Laterality Date    HX CATARACT REMOVAL      HX CHOLECYSTECTOMY      HX HYSTERECTOMY      HX KNEE REPLACEMENT      HX THYROIDECTOMY      PROSTHETIC IMPLANT NOS       Barriers to Learning/Limitations: None  Compensate with: N/A  Home Situation:   Home Situation  Home Environment: Private residence  # Steps to Enter: 3  Rails to Enter: Yes  Hand Rails : Bilateral  One/Two Story Residence: One story  Living Alone: No  Support Systems: Family member(s)  Patient Expects to be Discharged to[de-identified] Private residence  Current DME Used/Available at Home: None  Critical Behavior:  Neurologic State: Alert  Orientation Level: Oriented X4     Strength:    Strength: Within functional limits    Tone & Sensation:   Sensation: Intact     Range Of Motion:   AROM: Within functional limits     Posture:  Posture (WDL): Within defined limits      Functional Mobility:  Bed Mobility:  Rolling: Independent  Supine to Sit: Independent  Sit to Supine: Independent  Scooting: Independent  Transfers:  Sit to Stand: Independent  Stand to Sit: Independent    Balance:   Sitting: Intact  Standing: Intact  Ambulation/Gait Training:  Distance (ft): 120 Feet (ft)  Assistive Device: Other (comment)(N/A)  Ambulation - Level of Assistance: Independent     Gait Description (WDL): Exceptions to WDL    Stairs:  Pt states she will be fine doing steps and does not need to do them. Pain:  Pain level pre-treatment: 0/10   Pain level post-treatment: 0/10  Pain Location: N/A    Activity Tolerance:   Excellent  Please refer to the flowsheet for vital signs taken during this treatment. After treatment:   []         Patient left in no apparent distress sitting up in chair  [x]         Patient left in no apparent distress in bed  [x]         Call bell left within reach  [x]         Nursing notified  []         Caregiver present  []         Bed alarm activated  []         SCDs applied    COMMUNICATION/EDUCATION:   [x]         Role of Physical Therapy in the acute care setting. []         Fall prevention education was provided and the patient/caregiver indicated understanding. []         Patient/family have participated as able in goal setting and plan of care. []         Patient/family agree to work toward stated goals and plan of care. []         Patient understands intent and goals of therapy, but is neutral about his/her participation. []         Patient is unable to participate in goal setting/plan of care: ongoing with therapy staff.  []         Other:     Thank you for this referral.  Selvin Dominique, PT, DPT   Time Calculation: 15 mins

## 2021-02-10 NOTE — PROGRESS NOTES
Discharge instructions given, IV removed, telemetry discontinued, patient walked downstairs, patient discharged.

## 2021-02-10 NOTE — PROGRESS NOTES
Walking rounds completed, pt. Resting in bed, denies any pain, discomfort, or needs at this time. Will continue to monitor.  CBWR

## 2021-02-10 NOTE — ED NOTES
Pt. Continues to rest quietly. Pt. Updated on status of testing. Pts. Status remains unchanged and stable VSS. Will continue to monitor.

## 2021-05-07 NOTE — ED TRIAGE NOTES
Arrived via EMS who reports patient was at the [facility] this morning and suddenly developed left sided weakness and left facial droop. Upon arrival to the ER, patient alert and oriented with improvement in symptoms. 151 positive S1/positive S2